# Patient Record
Sex: FEMALE | Race: WHITE | NOT HISPANIC OR LATINO | Employment: UNEMPLOYED | URBAN - METROPOLITAN AREA
[De-identification: names, ages, dates, MRNs, and addresses within clinical notes are randomized per-mention and may not be internally consistent; named-entity substitution may affect disease eponyms.]

---

## 2023-07-25 PROBLEM — O30.049 DICHORIONIC DIAMNIOTIC TWIN GESTATION: Status: ACTIVE | Noted: 2023-07-25

## 2023-08-22 ENCOUNTER — INITIAL PRENATAL (OUTPATIENT)
Dept: OBGYN | Facility: CLINIC | Age: 23
End: 2023-08-22
Payer: COMMERCIAL

## 2023-08-22 ENCOUNTER — GYNECOLOGY VISIT (OUTPATIENT)
Dept: OBGYN | Facility: CLINIC | Age: 23
End: 2023-08-22
Payer: COMMERCIAL

## 2023-08-22 ENCOUNTER — HOSPITAL ENCOUNTER (OUTPATIENT)
Facility: MEDICAL CENTER | Age: 23
End: 2023-08-22
Attending: OBSTETRICS & GYNECOLOGY
Payer: COMMERCIAL

## 2023-08-22 VITALS — WEIGHT: 203 LBS | DIASTOLIC BLOOD PRESSURE: 80 MMHG | SYSTOLIC BLOOD PRESSURE: 133 MMHG

## 2023-08-22 DIAGNOSIS — Z34.90 PREGNANCY, UNSPECIFIED GESTATIONAL AGE: ICD-10-CM

## 2023-08-22 DIAGNOSIS — O26.893 RH NEGATIVE STATE IN ANTEPARTUM PERIOD, THIRD TRIMESTER: ICD-10-CM

## 2023-08-22 DIAGNOSIS — Z67.91 RH NEGATIVE STATE IN ANTEPARTUM PERIOD, THIRD TRIMESTER: ICD-10-CM

## 2023-08-22 DIAGNOSIS — O09.299 HISTORY OF GESTATIONAL DIABETES IN PRIOR PREGNANCY, CURRENTLY PREGNANT: ICD-10-CM

## 2023-08-22 DIAGNOSIS — Z86.32 HISTORY OF GESTATIONAL DIABETES IN PRIOR PREGNANCY, CURRENTLY PREGNANT: ICD-10-CM

## 2023-08-22 DIAGNOSIS — O09.93 SUPERVISION OF HIGH RISK PREGNANCY IN THIRD TRIMESTER: ICD-10-CM

## 2023-08-22 DIAGNOSIS — Z86.32 HISTORY OF GESTATIONAL DIABETES: ICD-10-CM

## 2023-08-22 DIAGNOSIS — O30.043 DICHORIONIC DIAMNIOTIC TWIN PREGNANCY IN THIRD TRIMESTER: ICD-10-CM

## 2023-08-22 LAB
ABO GROUP BLD: NORMAL
BLD GP AB SCN SERPL QL: NORMAL
ERYTHROCYTE [DISTWIDTH] IN BLOOD BY AUTOMATED COUNT: 37 FL (ref 35.9–50)
GLUCOSE 1H P 50 G GLC PO SERPL-MCNC: 197 MG/DL (ref 70–139)
HBV SURFACE AG SER QL: ABNORMAL
HCT VFR BLD AUTO: 37.1 % (ref 37–47)
HCV AB SER QL: ABNORMAL
HGB BLD-MCNC: 12 G/DL (ref 12–16)
HIV 1+2 AB+HIV1 P24 AG SERPL QL IA: NORMAL
MCH RBC QN AUTO: 26.4 PG (ref 27–33)
MCHC RBC AUTO-ENTMCNC: 32.3 G/DL (ref 32.2–35.5)
MCV RBC AUTO: 81.5 FL (ref 81.4–97.8)
PLATELET # BLD AUTO: 314 K/UL (ref 164–446)
PMV BLD AUTO: 10 FL (ref 9–12.9)
RBC # BLD AUTO: 4.55 M/UL (ref 4.2–5.4)
RH BLD: NORMAL
RUBV AB SER QL: 16.4 IU/ML
T PALLIDUM AB SER QL IA: ABNORMAL
WBC # BLD AUTO: 9.8 K/UL (ref 4.8–10.8)

## 2023-08-22 PROCEDURE — 3075F SYST BP GE 130 - 139MM HG: CPT | Performed by: OBSTETRICS & GYNECOLOGY

## 2023-08-22 PROCEDURE — 3079F DIAST BP 80-89 MM HG: CPT | Performed by: OBSTETRICS & GYNECOLOGY

## 2023-08-22 PROCEDURE — 90715 TDAP VACCINE 7 YRS/> IM: CPT | Performed by: OBSTETRICS & GYNECOLOGY

## 2023-08-22 PROCEDURE — 82950 GLUCOSE TEST: CPT

## 2023-08-22 PROCEDURE — 0500F INITIAL PRENATAL CARE VISIT: CPT | Performed by: OBSTETRICS & GYNECOLOGY

## 2023-08-22 PROCEDURE — 96372 THER/PROPH/DIAG INJ SC/IM: CPT | Performed by: OBSTETRICS & GYNECOLOGY

## 2023-08-22 PROCEDURE — 99999 PR NO CHARGE: CPT | Performed by: OBSTETRICS & GYNECOLOGY

## 2023-08-22 PROCEDURE — 87086 URINE CULTURE/COLONY COUNT: CPT

## 2023-08-22 PROCEDURE — 85027 COMPLETE CBC AUTOMATED: CPT

## 2023-08-22 PROCEDURE — 86850 RBC ANTIBODY SCREEN: CPT

## 2023-08-22 PROCEDURE — 87340 HEPATITIS B SURFACE AG IA: CPT

## 2023-08-22 PROCEDURE — 36415 COLL VENOUS BLD VENIPUNCTURE: CPT | Performed by: OBSTETRICS & GYNECOLOGY

## 2023-08-22 PROCEDURE — 86762 RUBELLA ANTIBODY: CPT

## 2023-08-22 PROCEDURE — 90471 IMMUNIZATION ADMIN: CPT | Performed by: OBSTETRICS & GYNECOLOGY

## 2023-08-22 PROCEDURE — 80307 DRUG TEST PRSMV CHEM ANLYZR: CPT

## 2023-08-22 PROCEDURE — 86901 BLOOD TYPING SEROLOGIC RH(D): CPT

## 2023-08-22 PROCEDURE — 86803 HEPATITIS C AB TEST: CPT

## 2023-08-22 PROCEDURE — 86900 BLOOD TYPING SEROLOGIC ABO: CPT

## 2023-08-22 PROCEDURE — 86780 TREPONEMA PALLIDUM: CPT

## 2023-08-22 PROCEDURE — 87389 HIV-1 AG W/HIV-1&-2 AB AG IA: CPT

## 2023-08-22 NOTE — Clinical Note
This encounter was canceled and changed. I signed the encounter type that I did, but this is showing as incomplete. If we could delete that would be great so I can clear my inbox

## 2023-08-22 NOTE — PROGRESS NOTES
Establish Pregnancy Visit    CC: First OB Visit    HPI: Patient is a 23 y.o.  at 30w1d who presents for her first OB visit.  She has known di/di twins and has been following with [facility]. She is feeling fetal movement.  She denies vaginal bleeding, denies leakage of fluid, denies contractions.   She denies nausea/vomiting, headaches, or urinary symptoms.      She has a lab work portal from an outside lab that shows her to be A-, antibody negative and she has not yet received rhogam. [place] 4[place]5 hours away. We discussed that typically we do labwork prior to administering rhogam, but in her case it is appropriate to given it now since she won't be able to return easily. Tdap was also administered    She has a history of two prior pregnancies. G1 was complicated by GDM. G2 was complicated by FGR. Chorionicity of this pregnancy has been confirmed by [facility] and they are starting NSTs at 32 weeks.     DATING:   Estimated Date of Delivery: 10/30/23  LMP (c/w 12wk US)     GYN HX:   Last Pap: , normal   Hx Moderate or Severe Dysplasia : no  Hx STD : no    OBSTETRIC HISTORY:  OB History    Para Term  AB Living   4 2 2   1 2   SAB IAB Ectopic Molar Multiple Live Births   1         2      # Outcome Date GA Lbr Jose Luis/2nd Weight Sex Delivery Anes PTL Lv   4 Current            3 Term 22   5 lb 13 oz M Vag-Spont   MINESH   2 Term 08/10/20   7 lb 2 oz M Vag-Spont   MINESH   1 SAB      SAB          MEDICAL HISTORY:  History reviewed. No pertinent past medical history.    MEDICATIONS:  Current Outpatient Medications on File Prior to Visit   Medication Sig Dispense Refill    Prenatal Vit-Fe Fumarate-FA (PRENATAL 1+1 PO) Take  by mouth.       No current facility-administered medications on file prior to visit.       FAMILY HISTORY:  Family History   Problem Relation Age of Onset    No Known Problems Mother     No Known Problems Father        SURGICAL HISTORY:  Past Surgical History:   Procedure Laterality Date     APPENDECTOMY      OVARIAN CYSTECTOMY         ALLERGIES / REACTIONS:  No Known Allergies             SOCIAL HISTORY:   reports that she has never smoked. She has never used smokeless tobacco. She reports that she does not currently use alcohol. She reports that she does not use drugs.    ROS:   Gen: no fevers or chills, no significant weight loss or gain, excessive fatigue  Respiratory:  no cough or dyspnea  Cardiac:  no chest pain, no palpitations, no syncope  Breast: no breast discharge, pain, lump or skin changes  GI:  no heartburn, no abdominal pain, no nausea or vomiting  Urinary: no dysuria, urgency, frequency, incontinence   Psych: no depression or anxiety  Neuro: no migraines with aura, fainting spells, numbness or tingling  Extremities: no joint pain, persistently swollen ankles, recurrent leg cramps         PHYSICAL EXAMINATION:  Vital Signs:   Vitals:    08/22/23 0937   BP: 133/80   Weight: 203 lb     There is no height or weight on file to calculate BMI.  Constitutional: The patient is well developed and well nourished.  Psychiatric: Patient is oriented to time place and person.   Skin: No rash observed.  Neck: Neck appears symmetric. There are no masses or adenopathy present.  Respiratory: normal effort  Abdomen: Soft, non-tender. Ultrasound was performed to confirm heart tones. Baby A had FHT of 142, Baby B had FHT of 141. Both were moving and active and fluid appeared normal. They were breech/transverse  Pelvic: Deferred   Extremeties: Legs are symmetric and without tenderness. There is no edema present.    ACOG SCREENING  Infection Prevention  1. High Risk For HIV: No 6. Rash Or Illness Since LMP: No     2. High Risk For Hepatitis B or C: No 7. History Of STD, GC, Chlamydia, HPV Syphilis: No     3. Live With Someone With TB Or Exposed To TB: No 8. Have a cat in the home?: No     4. Patient Or Partner Has A History Of Herpes: No      5. History of Chicken Pox: No 9. Other (See Comments Below): No             Genetic Screening/Teratology Counseling- Includes patient, baby's father, or anyone in either family with:  Patient's age 35 years or older as of estimated date of delivery: No     Thalassemia (Italian, Greek, Mediterranean, or  background): MCV less than 80: No     Neural tube defect (Meningomyelocele, Spina bifida, or Anencephaly): No     Congenital heart defect: No     Down syndrome: No     Germán-Sachs (Ashkenazi Sabianist, Cajun, Estonian Dunnell): No     Canavan disease (Ashkenazi Sabianist): No     Familial dysautonomia (Ashkenazi Sabianist): No     Sickle cell disease or trait (): No     Hemophilia or other blood disorders: No     Muscular dystrophy: No    Cystic fibrosis: No     Yuma's chorea: No     Mental retardation/autism: No     Other inherited genetic or chromosomal disorder: No     Maternal metabolic disorder (eg. Type 1 diabetes, PKU): No     Patient or baby's father had child with birth defects not listed above: No     Recurrent pregnancy loss, or a stillbirth: No     Medications (including supplements, vitamins, herbs, or OTC drugs)/illicit/recreational drugs/alcohol since last menstrual period: No             ASSESSMENT AND PLAN:  23 y.o.  at 30w1d     1. Dichorionic diamniotic twin pregnancy in third trimester  - Being followed by [facility] (records are in media)    2. Pregnancy, unspecified gestational age  - PREG CNTR PRENATAL PN; Future  - HEP C VIRUS ANTIBODY; Future  - URINE DRUG SCREEN W/CONF (AR); Future  - GLUCOSE 1HR GESTATIONAL; Future  - Consent for Rhogam  - rho d immune globulin 1500 unit/2 mL  - Chlamydia/GC, PCR (Genital/Anal swab); Future  - Tdap Vaccine =>6YO IM    3. History of gestational diabetes  - One hour pending    4. Rh negative state in antepartum period, third trimester  - Consent for Rhogam  - rho d immune globulin 1500 unit/2 mL    - Dating reviewed: Dated by LMP 12wk US  - Discussed options for genetic/aneuploidy testing and this has already been  completed and was normal   - Discussed recommendation for flu, Covid vaccine during pregnancy  - Discussed office policies, prenatal care timeline, weight gain, diet and activity.  - Taking PNV.  - Increase water intake and encouraged healthy nutrition. Encouraged moderate exercise may continue into final trimester.     Return in 2 weeks for next prenatal visit    Vangie Gutierrez M.D.

## 2023-08-24 ENCOUNTER — TELEPHONE (OUTPATIENT)
Dept: OBGYN | Facility: CLINIC | Age: 23
End: 2023-08-24

## 2023-08-24 LAB
AMPHET CTO UR CFM-MCNC: NEGATIVE NG/ML
BACTERIA UR CULT: NORMAL
BARBITURATES CTO UR CFM-MCNC: NEGATIVE NG/ML
BENZODIAZ CTO UR CFM-MCNC: NEGATIVE NG/ML
CANNABINOIDS CTO UR CFM-MCNC: NEGATIVE NG/ML
COCAINE CTO UR CFM-MCNC: NEGATIVE NG/ML
DRUG COMMENT 753798: NORMAL
METHADONE CTO UR CFM-MCNC: NEGATIVE NG/ML
OPIATES CTO UR CFM-MCNC: NEGATIVE NG/ML
PCP CTO UR CFM-MCNC: NEGATIVE NG/ML
PROPOXYPH CTO UR CFM-MCNC: NEGATIVE NG/ML
SIGNIFICANT IND 70042: NORMAL
SITE SITE: NORMAL
SOURCE SOURCE: NORMAL

## 2023-09-05 ENCOUNTER — NON-PROVIDER VISIT (OUTPATIENT)
Dept: OBGYN | Facility: CLINIC | Age: 23
End: 2023-09-05
Payer: COMMERCIAL

## 2023-09-05 ENCOUNTER — ROUTINE PRENATAL (OUTPATIENT)
Dept: OBGYN | Facility: CLINIC | Age: 23
End: 2023-09-05
Payer: COMMERCIAL

## 2023-09-05 VITALS — WEIGHT: 210.9 LBS

## 2023-09-05 DIAGNOSIS — O30.043 DICHORIONIC DIAMNIOTIC TWIN PREGNANCY IN THIRD TRIMESTER: ICD-10-CM

## 2023-09-05 LAB
NST ACOUSTIC STIMULATION: NORMAL
NST ACTION NECESSARY: NORMAL
NST ASSESSMENT: NORMAL
NST BASELINE: NORMAL
NST INDICATIONS: NORMAL
NST OTHER DATA: NORMAL
NST READ BY: NORMAL
NST RETURN: NORMAL
NST UTERINE ACTIVITY: NORMAL

## 2023-09-05 PROCEDURE — 0502F SUBSEQUENT PRENATAL CARE: CPT | Performed by: PHYSICIAN ASSISTANT

## 2023-09-05 NOTE — PROGRESS NOTES
S: 23 y.o.  at 32w1d presents for routine obstetric follow-up.   Good fetal movement.  No contractions, vaginal bleeding, or leakage of fluid.    Questions answered.    O: LMP 2023   Patients' weight gain, fluid intake and exercise level discussed.  Vitals, fundal height , fetal position, and FHR reviewed on flowsheet    NST non-reactive for both Twin A and B.  Baseline 140 for both with good variablity but no accels or decels. However, pt was seen at [facility] today before appt here and had reactive NST and BBP with  and  for twins. Report given by [facility] but no note yet, will send by end of day.     TDaP: Administered 23  GBS: N/A  Rh: negative Rhogam given 23  BTL:     A/P:  23 y.o.  at 32w1d presents for routine obstetric follow-up.  Size equals dates and/or scan    - Pt failed 1h GTT. Did not do 3h GTT, wants to check sugars x 1-2 weeks instead. Discussed ranges and frequency, has monitor at home. Did fail 1h with 197, likely does not need 3h and has GDM.   - Discussed she does not need NST here and [facility] on same day, can pick one location for NSTs  - Continue prenatal vitamins- pills not gummies.  - Fetal kick counts.  - Exercise at least 30 minutes daily. Drink at least 3-4L of water daily  - PTL precautions educated.    Follow-up in 2 weeks.    Rona Zamarripa P.A.-C.     I reviewed the case with Dr Hall who consulted and agrees with the plan.

## 2023-09-15 ENCOUNTER — ROUTINE PRENATAL (OUTPATIENT)
Dept: OBGYN | Facility: CLINIC | Age: 23
End: 2023-09-15
Payer: COMMERCIAL

## 2023-09-15 VITALS — WEIGHT: 206 LBS | SYSTOLIC BLOOD PRESSURE: 110 MMHG | DIASTOLIC BLOOD PRESSURE: 72 MMHG

## 2023-09-15 DIAGNOSIS — O26.893 RH NEGATIVE STATE IN ANTEPARTUM PERIOD, THIRD TRIMESTER: ICD-10-CM

## 2023-09-15 DIAGNOSIS — O09.93 HIGH-RISK PREGNANCY IN THIRD TRIMESTER: ICD-10-CM

## 2023-09-15 DIAGNOSIS — O30.043 DICHORIONIC DIAMNIOTIC TWIN PREGNANCY IN THIRD TRIMESTER: ICD-10-CM

## 2023-09-15 DIAGNOSIS — Z67.91 RH NEGATIVE STATE IN ANTEPARTUM PERIOD, THIRD TRIMESTER: ICD-10-CM

## 2023-09-15 PROCEDURE — 3078F DIAST BP <80 MM HG: CPT | Performed by: STUDENT IN AN ORGANIZED HEALTH CARE EDUCATION/TRAINING PROGRAM

## 2023-09-15 PROCEDURE — 0502F SUBSEQUENT PRENATAL CARE: CPT | Performed by: STUDENT IN AN ORGANIZED HEALTH CARE EDUCATION/TRAINING PROGRAM

## 2023-09-15 PROCEDURE — 3074F SYST BP LT 130 MM HG: CPT | Performed by: STUDENT IN AN ORGANIZED HEALTH CARE EDUCATION/TRAINING PROGRAM

## 2023-09-15 NOTE — PROGRESS NOTES
OB Visit Note - 33w4d     MEDICAL DECISION MAKING:  Azael Alvarenga is a 23 y.o. female  at 33w4d     S: Pt presents for routine OB follow up. Good fetal movement x2. No contractions, vaginal bleeding, or leakage of fluid. Has monitoring with [facility] later today.  Has been monitoring her blood sugars and has log with her today. Questions answered.     O:  Vitals:    09/15/23 1139   BP: 110/72     Patients' weight gain, fluid intake and exercise level discussed.    FHT: Baby A: 156 bpm; Baby B: 160 bpm    Presentation: breech/breech     See flow sheet.    Glucose lo/6 elevated fasting (115);  1/15 elevated at 1hr postprandial (141)        Recent Labs     23  1130   ABOGROUP A   ABSCRN NEG   HEMOGLOBIN 12.0   PLATELETCT 314   RUBELLAIGG 16.40   HEPBSAG Non-Reactive   HEPCAB Non-Reactive          A/P:Azael Alvarenga is a 23 y.o. female  at 33w4d who presents for routine obstetric follow-up.    P:  Continue FKCs.    Keep all appts with [facility]  Labor and return precautions given.  Instructions given on where to go - patient verbalized understanding.  All questions answered. Anticipatory guidance.  Encouraged adequate water intake.  PP contraception: undecided  GBS at 36 weeks  F/u in 1 week(s) and PRN      Pregnancy complicated by:  Patient Active Problem List   Diagnosis    Dichorionic diamniotic twin gestation    Supervision of high risk pregnancy in third trimester    History of intrauterine growth restriction in prior pregnancy, currently pregnant       The patient will follow up in 1 week(s). She was counseled to call and/or report to L&D for vaginal bleeding, regular contractions, leakage of fluid or decreased fetal movement.    Meliton Garcia D.O.

## 2023-09-21 ENCOUNTER — TELEPHONE (OUTPATIENT)
Dept: OBGYN | Facility: CLINIC | Age: 23
End: 2023-09-21

## 2023-09-22 DIAGNOSIS — O30.003 TWIN GESTATION IN THIRD TRIMESTER, UNSPECIFIED MULTIPLE GESTATION TYPE: ICD-10-CM

## 2023-09-22 NOTE — PROGRESS NOTES
Receive [facility] recommendation to delivery between 36 and 37 weeks which is between 10/2/23-10/9/23. Fetus A is breech so a scheduling request for a  was placed. I called and left a message for the patient.   Vangie Gutierrez M.D.

## 2023-09-26 ENCOUNTER — HOSPITAL ENCOUNTER (OUTPATIENT)
Facility: MEDICAL CENTER | Age: 23
End: 2023-09-26
Attending: OBSTETRICS & GYNECOLOGY | Admitting: OBSTETRICS & GYNECOLOGY
Payer: COMMERCIAL

## 2023-09-29 ENCOUNTER — ROUTINE PRENATAL (OUTPATIENT)
Dept: OBGYN | Facility: CLINIC | Age: 23
End: 2023-09-29
Payer: COMMERCIAL

## 2023-09-29 ENCOUNTER — HOSPITAL ENCOUNTER (OUTPATIENT)
Facility: MEDICAL CENTER | Age: 23
End: 2023-09-29
Attending: OBSTETRICS & GYNECOLOGY
Payer: COMMERCIAL

## 2023-09-29 VITALS — DIASTOLIC BLOOD PRESSURE: 82 MMHG | WEIGHT: 207.4 LBS | SYSTOLIC BLOOD PRESSURE: 111 MMHG

## 2023-09-29 DIAGNOSIS — O30.043 DICHORIONIC DIAMNIOTIC TWIN PREGNANCY IN THIRD TRIMESTER: ICD-10-CM

## 2023-09-29 DIAGNOSIS — O09.93 SUPERVISION OF HIGH RISK PREGNANCY IN THIRD TRIMESTER: Primary | ICD-10-CM

## 2023-09-29 PROCEDURE — 87150 DNA/RNA AMPLIFIED PROBE: CPT

## 2023-09-29 PROCEDURE — 3074F SYST BP LT 130 MM HG: CPT | Performed by: OBSTETRICS & GYNECOLOGY

## 2023-09-29 PROCEDURE — 0502F SUBSEQUENT PRENATAL CARE: CPT | Performed by: OBSTETRICS & GYNECOLOGY

## 2023-09-29 PROCEDURE — 87081 CULTURE SCREEN ONLY: CPT

## 2023-09-29 PROCEDURE — 3079F DIAST BP 80-89 MM HG: CPT | Performed by: OBSTETRICS & GYNECOLOGY

## 2023-09-29 NOTE — PROGRESS NOTES
S: Pt presents for routine OB follow up. Good fetal movement. Denies persistent contractions, vaginal bleeding, or leakage of fluid.     O: /82   Wt 207 lb 6.4 oz   LMP 2023   Patients' weight gain, fluid intake and exercise level discussed.  Vitals, fundal height , fetal position, and FHR reviewed on flowsheet    Lab:No results found for this or any previous visit (from the past 336 hour(s)).    A/P:  23 y.o.  at 35w4d presents for routine obstetric follow-up.    Diagnoses and all orders for this visit:    Supervision of high risk pregnancy in third trimester  -     GRP B STREP, BY PCR (VIVEROS BROTH); Future  -     Induction of Labor and ; Future    Dichorionic diamniotic twin pregnancy in third trimester  -     Induction of Labor and ; Future    IUGR  - Baby A and baby B are IUGR. She has an appointment with [facility] today for dopplers and NST.   - [facility] recommends delivery between 36 and 37 weeks. Patient would like to be delivered at 36 weeks due to 's work schedule. She would really like to have a vaginal delivery. Baby A is currently cephalic. Previously babies were breech. She will need to discuss mode of delivery with physician on call at the hospital that day. Plan for steroids on  and 10/1 with delivery on 10/2.    Labor precautions discussed.  Emma Bowling M.D.

## 2023-09-30 ENCOUNTER — APPOINTMENT (OUTPATIENT)
Dept: OBGYN | Facility: MEDICAL CENTER | Age: 23
End: 2023-09-30
Attending: OBSTETRICS & GYNECOLOGY
Payer: COMMERCIAL

## 2023-09-30 ENCOUNTER — HOSPITAL ENCOUNTER (OUTPATIENT)
Facility: MEDICAL CENTER | Age: 23
End: 2023-09-30
Attending: OBSTETRICS & GYNECOLOGY | Admitting: OBSTETRICS & GYNECOLOGY
Payer: COMMERCIAL

## 2023-09-30 ENCOUNTER — APPOINTMENT (OUTPATIENT)
Dept: RADIOLOGY | Facility: MEDICAL CENTER | Age: 23
End: 2023-09-30
Payer: COMMERCIAL

## 2023-09-30 VITALS
HEIGHT: 66 IN | BODY MASS INDEX: 33.27 KG/M2 | DIASTOLIC BLOOD PRESSURE: 71 MMHG | SYSTOLIC BLOOD PRESSURE: 110 MMHG | WEIGHT: 207 LBS | HEART RATE: 101 BPM | TEMPERATURE: 98.8 F | RESPIRATION RATE: 16 BRPM | OXYGEN SATURATION: 96 %

## 2023-09-30 LAB — GP B STREP DNA SPEC QL NAA+PROBE: NEGATIVE

## 2023-09-30 PROCEDURE — 700111 HCHG RX REV CODE 636 W/ 250 OVERRIDE (IP): Performed by: OBSTETRICS & GYNECOLOGY

## 2023-09-30 PROCEDURE — 96372 THER/PROPH/DIAG INJ SC/IM: CPT

## 2023-09-30 PROCEDURE — 59025 FETAL NON-STRESS TEST: CPT

## 2023-09-30 PROCEDURE — 76819 FETAL BIOPHYS PROFIL W/O NST: CPT

## 2023-09-30 RX ORDER — BETAMETHASONE SODIUM PHOSPHATE AND BETAMETHASONE ACETATE 3; 3 MG/ML; MG/ML
12 INJECTION, SUSPENSION INTRA-ARTICULAR; INTRALESIONAL; INTRAMUSCULAR; SOFT TISSUE EVERY 24 HOURS
Status: DISCONTINUED | OUTPATIENT
Start: 2023-09-30 | End: 2023-09-30 | Stop reason: HOSPADM

## 2023-09-30 RX ADMIN — BETAMETHASONE SODIUM PHOSPHATE AND BETAMETHASONE ACETATE 12 MG: 3; 3 INJECTION, SUSPENSION INTRA-ARTICULAR; INTRALESIONAL; INTRAMUSCULAR at 09:49

## 2023-09-30 NOTE — PROGRESS NOTES
, EDC 10/30, GA 35w5d, di-di twins. Pt presents to L&D for scheduled betamethasone injection. Pt denies LOF, VB, or UCs and reports + fetal movement. Pt escorted to triage room, oriented to room and unit, and external monitors applied. POC discussed with pt and s/o and encouraged to state needs or questions at any time.     0908 Dr. Ashton given report. Provider to bedside, POC discussed with pt and s/o.    0917 Dr. Watts at bedside, POC discussed with pt and s/o.    1153 Ultrasonographer at bedside, monitors removed for exam.    1235 Dr. Ashton updated, D/C order received.    1241 L&D D/C instructions reviewed with pt and s/o who state understanding. Pt d/c to self with s/o.

## 2023-09-30 NOTE — ED PROVIDER NOTES
Attestation statement:      I have personally seen and examined the patient and discussed the management with the resident.  I reviewed the residents note and agree with the documented findings and plan of care.  Additional attending comments;     LABOR AND DELIVERY TRIAGE NOTE    PATIENT ID:  NAME:  Azael Alvarenga  MRN:               [id number]  YOB: 2000    This is a pleasant 23 y.o. female  at 35w5d here for scheduled betamethasone dose.  Pregnancy has been complicated by di-/di twin gestation with severe IUGR affecting both babies.  Has a history of IUGR  and GDM in previous pregnancies. She has been followed by [facility] with routine NSTs starting at 32 weeks. Patient has scheduled IOL in 2 days when she is 36 weeks.  Will be receiving second dose of betamethasone tomorrow, 10/1/23.     negative for contractions  negative pain   negative for LOF  negative for vaginal bleeding  positive for fetal movement    PNL:  Blood Type A, Rh negative, received RhoGAM given 2023, Rubella immune, HIV neg, TrepAb neg, HBsAg NR, GC/CT unk/unk  1 HR GTT: 197  3 HR GTT: Declined  GBS pending     ROS: Patient denies any fever chills, nausea, vomiting, headache, chest pain, shortness of breath, or dysuria or unusual swelling of hands or feet.     PMHx:   No past medical history on file.     OB History    Para Term  AB Living   4 2 2   1 2   SAB IAB Ectopic Molar Multiple Live Births   1         2      # Outcome Date GA Lbr Jose Luis/2nd Weight Sex Delivery Anes PTL Lv   4 Current            3 Term 22   2.637 kg (5 lb 13 oz) M Vag-Spont   MINESH   2 Term 08/10/20   3.232 kg (7 lb 2 oz) M Vag-Spont   MINESH   1 SAB      SAB          GYN: denies STIs, no cervical procedures    PSHx:  Past Surgical History:   Procedure Laterality Date    APPENDECTOMY      OVARIAN CYSTECTOMY         Social Hx:  Social History     Tobacco Use    Smoking status: Never    Smokeless tobacco: Never   Vaping Use    Vaping  "Use: Never used   Substance Use Topics    Alcohol use: Not Currently    Drug use: Never         Medications:  No current facility-administered medications on file prior to encounter.     Current Outpatient Medications on File Prior to Encounter   Medication Sig Dispense Refill    Prenatal Vit-Fe Fumarate-FA (PRENATAL 1+1 PO) Take  by mouth. (Patient not taking: Reported on 2023)         Allergies:  Patient has no known allergies.        Objective:    Vitals:    23 0903   BP: 110/71   Pulse: (!) 101   Resp: 16   Temp: 37.1 °C (98.8 °F)   TempSrc: Temporal   SpO2: 96%   Weight: 93.9 kg (207 lb)   Height: 1.676 m (5' 6\")     Temp (24hrs), Av.1 °C (98.8 °F), Min:37.1 °C (98.8 °F), Max:37.1 °C (98.8 °F)    General: No acute distress, resting comfortably in bed.  HEENT: normocephalic, nontraumatic, PERRLA, EOMI  Cardiovascular: Heart RRR with no murmurs, rubs or gallops. Distal Pulses 2+  Respiratory: symmetric chest expansion, lungs CTAB, with no wheezes, rales, rhonci  Abdomen: gravid, nontender  Musculoskeletal: SIEGEL spontaneously  Neuro: non focal with no numbness, tingling or changes in sensation    Cervix:  Deferred  [place]: Uterine Contractions: none    FHT:  Twin A baseline 140, moderate variability, +accels, no decels, passed NST   Twin B baseline 130, moderate variability, minimal accels, no decels, failed NST     BPP:  8/8 both twins     Labs:   GBS pending     Assessment: 23 y.o. female  at 35w5d presents for scheduled BTMZ for  IOL scheduled on 10/2/2023.     #SIUP @ 35w5d by 12wk   #Di/Di twin pregnancy  #Severe IUGR   #History of IUGR in previous pregnancy  -Cont. Prenatal care with [facility]  -Followed by [facility]  -Rec'd BTMZ x1 today   -Second scheduled BTMZ dose tomorrow, 10/1/2023  -Plan for scheduled IOL on 10/2/2023    #GDM  -History of GDMA1 in G1 pregnancy  -Failed 1hr gtt (197), refused 3hr gtt  -Has been keeping BS log, does not want to be on medications   -Encouraged cont. BS " log, exercise/diet/hydration     #Fetal status   -Twin A passed NST, twin B failed NST, therefore BPP was obtained and 8/8 for both twins    #GBS status  -GBS: unknown, results pending      #Rubella immune, HIV neg, TrepAb neg, HBsAg NR, GC/CT u/u    #healthcare maintenance   -TdaP 8/22/2023      Patient is cleared to return home with family. Encouraged to see MD/DO for increased painful uterine contractions @ 3-5, vaginal bleeding, loss of fluid, or other serious symptoms. Return tomorrow for next BTMZ dose, and scheduled IOL on 10/2.       Discussed case with Dr. Jose G Watts, [facility] Attending. Case was discussed and attending agreed with plan prior to discharge of patient.      Azeb Ashton D.O.  PGY-1, [facility]

## 2023-10-01 ENCOUNTER — HOSPITAL ENCOUNTER (OUTPATIENT)
Facility: MEDICAL CENTER | Age: 23
End: 2023-10-01
Attending: OBSTETRICS & GYNECOLOGY | Admitting: OBSTETRICS & GYNECOLOGY
Payer: COMMERCIAL

## 2023-10-01 ENCOUNTER — APPOINTMENT (OUTPATIENT)
Dept: OBGYN | Facility: MEDICAL CENTER | Age: 23
End: 2023-10-01
Attending: OBSTETRICS & GYNECOLOGY
Payer: COMMERCIAL

## 2023-10-01 VITALS
DIASTOLIC BLOOD PRESSURE: 73 MMHG | OXYGEN SATURATION: 95 % | RESPIRATION RATE: 18 BRPM | TEMPERATURE: 97.3 F | WEIGHT: 207 LBS | HEIGHT: 66 IN | BODY MASS INDEX: 33.27 KG/M2 | SYSTOLIC BLOOD PRESSURE: 108 MMHG | HEART RATE: 109 BPM

## 2023-10-01 PROCEDURE — 96372 THER/PROPH/DIAG INJ SC/IM: CPT

## 2023-10-01 PROCEDURE — 99999 PR NO CHARGE: CPT | Performed by: OBSTETRICS & GYNECOLOGY

## 2023-10-01 PROCEDURE — 59025 FETAL NON-STRESS TEST: CPT

## 2023-10-01 PROCEDURE — 700111 HCHG RX REV CODE 636 W/ 250 OVERRIDE (IP): Performed by: OBSTETRICS & GYNECOLOGY

## 2023-10-01 PROCEDURE — 59025 FETAL NON-STRESS TEST: CPT | Mod: 26 | Performed by: OBSTETRICS & GYNECOLOGY

## 2023-10-01 RX ORDER — BETAMETHASONE SODIUM PHOSPHATE AND BETAMETHASONE ACETATE 3; 3 MG/ML; MG/ML
12 INJECTION, SUSPENSION INTRA-ARTICULAR; INTRALESIONAL; INTRAMUSCULAR; SOFT TISSUE ONCE
Status: COMPLETED | OUTPATIENT
Start: 2023-10-01 | End: 2023-10-01

## 2023-10-01 RX ADMIN — BETAMETHASONE SODIUM PHOSPHATE AND BETAMETHASONE ACETATE 12 MG: 3; 3 INJECTION, SUSPENSION INTRA-ARTICULAR; INTRALESIONAL; INTRAMUSCULAR at 10:10

## 2023-10-01 NOTE — PROGRESS NOTES
0900- Pt is a  at 35.6 weeks gestation with di-di twins, presenting to triage for scheduled betamethasone injection. Pt reports +FM x2, no utx, no LOF or VB, no HA/vision changes/RUQ pain or n/v, EFM/ TOCO applied x3, call light within reach.    1000- Dr. Ashton in department, update given, provider to bedside.  1020-  in department, strips reviewed, orders received for discharge.   1030- Discharge instructions educated to patient with  labor precautions and pt verbalized understanding of when to return to the hospital. Pt states that she feels safe leaving at this time. D/C paperwork signed.

## 2023-10-01 NOTE — ED PROVIDER NOTES
LABOR & DELIVERY TRIAGE HISTORY AND PHYSICAL  Patient Name: Azael Alvarenga Age/Sex: 23 y.o. female  : 2000 MRN: [id number]      HISTORY OF PRESENT ILLNESS:  This is a pleasant 23 y.o. female  at 35w6d here for second scheduled betamethasone dose. Has scheduled IOL for tomorrow secondary to pregnancy c/b di-/di twin gestation with severe IUGR affecting both babies.  Has a history of IUGR and GDM in her two previous pregnancies. She has been followed by [facility] with routine NSTs starting at 32 weeks.  She was in the clinic yesterday for her initial dose of betamethasone and at that time twin A had a reactive NST, and twin B had a nonreactive NST.  BPP was completed and  for both twins.    Fetal movement: Yes  Leakage of Fluid: No  Vaginal Bleeding: No  Contractions: No    Her EDC is 10/30/2023, by Last Menstrual Period.     She has received prenatal care with [facility], followed by [facility].  Complications during pregnancy: Di/Di twin gestation with severe IUGR in both twins     History of STD: No     OBSTETRICAL HISTORY:    OB History    Para Term  AB Living   4 2 2   1 2   SAB IAB Ectopic Molar Multiple Live Births   1         2      # Outcome Date GA Lbr Jose Luis/2nd Weight Sex Delivery Anes PTL Lv   4 Current            3 Term 22   2.637 kg (5 lb 13 oz) M Vag-Spont   MINESH   2 Term 08/10/20   3.232 kg (7 lb 2 oz) M Vag-Spont   MINESH   1 SAB      SAB          MEDICAL HISTORY:  No past medical history on file.    SURGICAL HISTORY:  Past Surgical History:   Procedure Laterality Date    APPENDECTOMY      OVARIAN CYSTECTOMY         MEDICATIONS:  Medications Prior to Admission   Medication Sig Dispense Refill Last Dose    Prenatal Vit-Fe Fumarate-FA (PRENATAL 1+1 PO) Take  by mouth. (Patient not taking: Reported on 2023)          ALLERGIES:  No Known Allergies     SOCIAL HISTORY:   Tobacco use: No  Alcohol use: No  Recreational drug use: No   reports that she has never smoked. She has never used  smokeless tobacco. She reports that she does not currently use alcohol. She reports that she does not use drugs.    FAMILY HISTORY:  Clotting/bleeding disorders: No  Gynecological cancers: No  Family History   Problem Relation Age of Onset    No Known Problems Mother     No Known Problems Father        REVIEW OF SYSTEMS:  A comprehensive review of systems was negative.      PHYSICAL EXAM:  Temp:  [36.3 °C (97.3 °F)] 36.3 °C (97.3 °F)  Pulse:  [109-110] 109  Resp:  [18] 18  BP: (108)/(73) 108/73  SpO2:  [95 %] 95 %  Body mass index is 33.41 kg/m².    General:  AAOx3, NAD   Pulmonary:  Non-labored respirations   Abdomen: Soft and non-tender, gravid uterus   FHT: Both twins: baseline 140-145, moderate variability, +accels, no decels   Contractions: None   SVE: Deferred        Prenatal Labs:  Blood Type A, Rh negative, received RhoGAM given 2023, Rubella immune, HIV neg, TrepAb neg, HBsAg NR, GC/CT unk/unk  1 HR GTT: 197  3 HR GTT: Declined  GBS negative       ASSESSMENT/ PLAN:  23 y.o. female  at 35w6d here for second scheduled betamethasone dose.     1. BTMZ administered  2. NSTs reactive in both infants  3. Discharge home and return tomorrow for scheduled IOL    Pt encouraged to return sooner for increased painful uterine contractions @ 3-5, vaginal bleeding, loss of fluid, or other serious symptoms. Discussed case with Dr. Katt Wang, [facility] Attending. Case was discussed and attending agreed with plan prior to discharge of patient.      Azeb Ashton DO   PGY-1 Resident   [facility]

## 2023-10-02 ENCOUNTER — ANESTHESIA (OUTPATIENT)
Dept: ANESTHESIOLOGY | Facility: MEDICAL CENTER | Age: 23
End: 2023-10-02
Payer: COMMERCIAL

## 2023-10-02 ENCOUNTER — ANESTHESIA EVENT (OUTPATIENT)
Dept: ANESTHESIOLOGY | Facility: MEDICAL CENTER | Age: 23
End: 2023-10-02
Payer: COMMERCIAL

## 2023-10-02 ENCOUNTER — APPOINTMENT (OUTPATIENT)
Dept: OBGYN | Facility: MEDICAL CENTER | Age: 23
End: 2023-10-02
Attending: OBSTETRICS & GYNECOLOGY
Payer: COMMERCIAL

## 2023-10-02 ENCOUNTER — HOSPITAL ENCOUNTER (INPATIENT)
Facility: MEDICAL CENTER | Age: 23
LOS: 2 days | End: 2023-10-04
Attending: OBSTETRICS & GYNECOLOGY | Admitting: OBSTETRICS & GYNECOLOGY
Payer: COMMERCIAL

## 2023-10-02 LAB
BASOPHILS # BLD AUTO: 0.1 % (ref 0–1.8)
BASOPHILS # BLD: 0.01 K/UL (ref 0–0.12)
EOSINOPHIL # BLD AUTO: 0 K/UL (ref 0–0.51)
EOSINOPHIL NFR BLD: 0 % (ref 0–6.9)
ERYTHROCYTE [DISTWIDTH] IN BLOOD BY AUTOMATED COUNT: 36.5 FL (ref 35.9–50)
HCT VFR BLD AUTO: 31.6 % (ref 37–47)
HGB BLD-MCNC: 10.1 G/DL (ref 12–16)
HOLDING TUBE BB 8507: NORMAL
IMM GRANULOCYTES # BLD AUTO: 0.07 K/UL (ref 0–0.11)
IMM GRANULOCYTES NFR BLD AUTO: 0.5 % (ref 0–0.9)
LYMPHOCYTES # BLD AUTO: 2.33 K/UL (ref 1–4.8)
LYMPHOCYTES NFR BLD: 17.4 % (ref 22–41)
MCH RBC QN AUTO: 24.7 PG (ref 27–33)
MCHC RBC AUTO-ENTMCNC: 32 G/DL (ref 32.2–35.5)
MCV RBC AUTO: 77.3 FL (ref 81.4–97.8)
MONOCYTES # BLD AUTO: 0.93 K/UL (ref 0–0.85)
MONOCYTES NFR BLD AUTO: 6.9 % (ref 0–13.4)
NEUTROPHILS # BLD AUTO: 10.06 K/UL (ref 1.82–7.42)
NEUTROPHILS NFR BLD: 75.1 % (ref 44–72)
NRBC # BLD AUTO: 0 K/UL
NRBC BLD-RTO: 0 /100 WBC (ref 0–0.2)
PLATELET # BLD AUTO: 296 K/UL (ref 164–446)
PMV BLD AUTO: 10.2 FL (ref 9–12.9)
RBC # BLD AUTO: 4.09 M/UL (ref 4.2–5.4)
T PALLIDUM AB SER QL IA: NORMAL
WBC # BLD AUTO: 13.4 K/UL (ref 4.8–10.8)

## 2023-10-02 PROCEDURE — 700111 HCHG RX REV CODE 636 W/ 250 OVERRIDE (IP): Performed by: STUDENT IN AN ORGANIZED HEALTH CARE EDUCATION/TRAINING PROGRAM

## 2023-10-02 PROCEDURE — 303615 HCHG EPIDURAL/SPINAL ANESTHESIA FOR LABOR

## 2023-10-02 PROCEDURE — 3E0P7VZ INTRODUCTION OF HORMONE INTO FEMALE REPRODUCTIVE, VIA NATURAL OR ARTIFICIAL OPENING: ICD-10-PCS | Performed by: OBSTETRICS & GYNECOLOGY

## 2023-10-02 PROCEDURE — 700105 HCHG RX REV CODE 258: Performed by: STUDENT IN AN ORGANIZED HEALTH CARE EDUCATION/TRAINING PROGRAM

## 2023-10-02 PROCEDURE — 36415 COLL VENOUS BLD VENIPUNCTURE: CPT

## 2023-10-02 PROCEDURE — 10907ZC DRAINAGE OF AMNIOTIC FLUID, THERAPEUTIC FROM PRODUCTS OF CONCEPTION, VIA NATURAL OR ARTIFICIAL OPENING: ICD-10-PCS | Performed by: OBSTETRICS & GYNECOLOGY

## 2023-10-02 PROCEDURE — 700101 HCHG RX REV CODE 250: Performed by: STUDENT IN AN ORGANIZED HEALTH CARE EDUCATION/TRAINING PROGRAM

## 2023-10-02 PROCEDURE — 700111 HCHG RX REV CODE 636 W/ 250 OVERRIDE (IP): Mod: JZ | Performed by: STUDENT IN AN ORGANIZED HEALTH CARE EDUCATION/TRAINING PROGRAM

## 2023-10-02 PROCEDURE — 88307 TISSUE EXAM BY PATHOLOGIST: CPT | Mod: 59

## 2023-10-02 PROCEDURE — 86780 TREPONEMA PALLIDUM: CPT

## 2023-10-02 PROCEDURE — 85025 COMPLETE CBC W/AUTO DIFF WBC: CPT

## 2023-10-02 PROCEDURE — 700102 HCHG RX REV CODE 250 W/ 637 OVERRIDE(OP): Performed by: STUDENT IN AN ORGANIZED HEALTH CARE EDUCATION/TRAINING PROGRAM

## 2023-10-02 PROCEDURE — 770002 HCHG ROOM/CARE - OB PRIVATE (112)

## 2023-10-02 PROCEDURE — 700111 HCHG RX REV CODE 636 W/ 250 OVERRIDE (IP): Mod: JZ

## 2023-10-02 PROCEDURE — A9270 NON-COVERED ITEM OR SERVICE: HCPCS | Performed by: STUDENT IN AN ORGANIZED HEALTH CARE EDUCATION/TRAINING PROGRAM

## 2023-10-02 RX ORDER — CARBOPROST TROMETHAMINE 250 UG/ML
250 INJECTION, SOLUTION INTRAMUSCULAR
Status: DISCONTINUED | OUTPATIENT
Start: 2023-10-02 | End: 2023-10-03 | Stop reason: HOSPADM

## 2023-10-02 RX ORDER — ALUMINA, MAGNESIA, AND SIMETHICONE 2400; 2400; 240 MG/30ML; MG/30ML; MG/30ML
30 SUSPENSION ORAL EVERY 6 HOURS PRN
Status: DISCONTINUED | OUTPATIENT
Start: 2023-10-02 | End: 2023-10-03 | Stop reason: HOSPADM

## 2023-10-02 RX ORDER — ROPIVACAINE HYDROCHLORIDE 2 MG/ML
INJECTION, SOLUTION EPIDURAL; INFILTRATION PRN
Status: DISCONTINUED | OUTPATIENT
Start: 2023-10-02 | End: 2023-10-02 | Stop reason: SURG

## 2023-10-02 RX ORDER — ONDANSETRON 2 MG/ML
4 INJECTION INTRAMUSCULAR; INTRAVENOUS EVERY 6 HOURS PRN
Status: DISCONTINUED | OUTPATIENT
Start: 2023-10-02 | End: 2023-10-03 | Stop reason: HOSPADM

## 2023-10-02 RX ORDER — EPHEDRINE SULFATE 50 MG/ML
5 INJECTION, SOLUTION INTRAVENOUS
Status: DISCONTINUED | OUTPATIENT
Start: 2023-10-02 | End: 2023-10-03 | Stop reason: HOSPADM

## 2023-10-02 RX ORDER — SODIUM CHLORIDE, SODIUM LACTATE, POTASSIUM CHLORIDE, CALCIUM CHLORIDE 600; 310; 30; 20 MG/100ML; MG/100ML; MG/100ML; MG/100ML
INJECTION, SOLUTION INTRAVENOUS CONTINUOUS
Status: DISCONTINUED | OUTPATIENT
Start: 2023-10-02 | End: 2023-10-03

## 2023-10-02 RX ORDER — ACETAMINOPHEN 500 MG
1000 TABLET ORAL
Status: DISCONTINUED | OUTPATIENT
Start: 2023-10-02 | End: 2023-10-03 | Stop reason: HOSPADM

## 2023-10-02 RX ORDER — TERBUTALINE SULFATE 1 MG/ML
0.25 INJECTION, SOLUTION SUBCUTANEOUS
Status: DISCONTINUED | OUTPATIENT
Start: 2023-10-02 | End: 2023-10-03 | Stop reason: HOSPADM

## 2023-10-02 RX ORDER — LIDOCAINE HYDROCHLORIDE 10 MG/ML
20 INJECTION, SOLUTION INFILTRATION; PERINEURAL
Status: DISCONTINUED | OUTPATIENT
Start: 2023-10-02 | End: 2023-10-03 | Stop reason: HOSPADM

## 2023-10-02 RX ORDER — SODIUM CHLORIDE, SODIUM LACTATE, POTASSIUM CHLORIDE, AND CALCIUM CHLORIDE .6; .31; .03; .02 G/100ML; G/100ML; G/100ML; G/100ML
1000 INJECTION, SOLUTION INTRAVENOUS
Status: COMPLETED | OUTPATIENT
Start: 2023-10-02 | End: 2023-10-02

## 2023-10-02 RX ORDER — LIDOCAINE HYDROCHLORIDE AND EPINEPHRINE 15; 5 MG/ML; UG/ML
INJECTION, SOLUTION EPIDURAL PRN
Status: DISCONTINUED | OUTPATIENT
Start: 2023-10-02 | End: 2023-10-02 | Stop reason: SURG

## 2023-10-02 RX ORDER — MISOPROSTOL 200 UG/1
800 TABLET ORAL
Status: DISCONTINUED | OUTPATIENT
Start: 2023-10-02 | End: 2023-10-03 | Stop reason: HOSPADM

## 2023-10-02 RX ORDER — IBUPROFEN 800 MG/1
800 TABLET ORAL
Status: COMPLETED | OUTPATIENT
Start: 2023-10-02 | End: 2023-10-03

## 2023-10-02 RX ORDER — ROPIVACAINE HYDROCHLORIDE 2 MG/ML
INJECTION, SOLUTION EPIDURAL; INFILTRATION; PERINEURAL CONTINUOUS
Status: DISCONTINUED | OUTPATIENT
Start: 2023-10-02 | End: 2023-10-03

## 2023-10-02 RX ORDER — ONDANSETRON 4 MG/1
4 TABLET, ORALLY DISINTEGRATING ORAL EVERY 6 HOURS PRN
Status: DISCONTINUED | OUTPATIENT
Start: 2023-10-02 | End: 2023-10-03 | Stop reason: HOSPADM

## 2023-10-02 RX ORDER — ROPIVACAINE HYDROCHLORIDE 2 MG/ML
INJECTION, SOLUTION EPIDURAL; INFILTRATION; PERINEURAL
Status: COMPLETED
Start: 2023-10-02 | End: 2023-10-02

## 2023-10-02 RX ORDER — METHYLERGONOVINE MALEATE 0.2 MG/ML
0.2 INJECTION INTRAVENOUS
Status: DISCONTINUED | OUTPATIENT
Start: 2023-10-02 | End: 2023-10-03 | Stop reason: HOSPADM

## 2023-10-02 RX ORDER — HYDROXYZINE 50 MG/1
50 TABLET, FILM COATED ORAL EVERY 6 HOURS PRN
Status: DISCONTINUED | OUTPATIENT
Start: 2023-10-02 | End: 2023-10-03 | Stop reason: HOSPADM

## 2023-10-02 RX ORDER — SODIUM CHLORIDE, SODIUM LACTATE, POTASSIUM CHLORIDE, AND CALCIUM CHLORIDE .6; .31; .03; .02 G/100ML; G/100ML; G/100ML; G/100ML
250 INJECTION, SOLUTION INTRAVENOUS PRN
Status: DISCONTINUED | OUTPATIENT
Start: 2023-10-02 | End: 2023-10-03 | Stop reason: HOSPADM

## 2023-10-02 RX ORDER — OXYTOCIN 10 [USP'U]/ML
10 INJECTION, SOLUTION INTRAMUSCULAR; INTRAVENOUS
Status: DISCONTINUED | OUTPATIENT
Start: 2023-10-02 | End: 2023-10-03 | Stop reason: HOSPADM

## 2023-10-02 RX ADMIN — ROPIVACAINE HYDROCHLORIDE 10 ML: 5 INJECTION, SOLUTION EPIDURAL; INFILTRATION; PERINEURAL at 17:30

## 2023-10-02 RX ADMIN — SODIUM CHLORIDE, POTASSIUM CHLORIDE, SODIUM LACTATE AND CALCIUM CHLORIDE: 600; 310; 30; 20 INJECTION, SOLUTION INTRAVENOUS at 17:48

## 2023-10-02 RX ADMIN — OXYTOCIN 2 MILLI-UNITS/MIN: 10 INJECTION, SOLUTION INTRAMUSCULAR; INTRAVENOUS at 18:28

## 2023-10-02 RX ADMIN — SODIUM CHLORIDE, POTASSIUM CHLORIDE, SODIUM LACTATE AND CALCIUM CHLORIDE 1000 ML: 600; 310; 30; 20 INJECTION, SOLUTION INTRAVENOUS at 17:05

## 2023-10-02 RX ADMIN — ROPIVACAINE HYDROCHLORIDE 200 MG: 2 INJECTION, SOLUTION EPIDURAL; INFILTRATION; PERINEURAL at 17:34

## 2023-10-02 RX ADMIN — LIDOCAINE HYDROCHLORIDE,EPINEPHRINE BITARTRATE 3 ML: 15; .005 INJECTION, SOLUTION EPIDURAL; INFILTRATION; INTRACAUDAL; PERINEURAL at 17:27

## 2023-10-02 RX ADMIN — MISOPROSTOL 25 MCG: 100 TABLET ORAL at 13:01

## 2023-10-02 RX ADMIN — OXYTOCIN 20 UNITS: 10 INJECTION, SOLUTION INTRAMUSCULAR; INTRAVENOUS at 23:44

## 2023-10-02 RX ADMIN — ROPIVACAINE HYDROCHLORIDE 200 MG: 2 INJECTION, SOLUTION EPIDURAL; INFILTRATION at 17:34

## 2023-10-02 ASSESSMENT — LIFESTYLE VARIABLES
ALCOHOL_USE: NO
EVER_SMOKED: NEVER

## 2023-10-02 ASSESSMENT — PATIENT HEALTH QUESTIONNAIRE - PHQ9
2. FEELING DOWN, DEPRESSED, IRRITABLE, OR HOPELESS: NOT AT ALL
SUM OF ALL RESPONSES TO PHQ9 QUESTIONS 1 AND 2: 0
1. LITTLE INTEREST OR PLEASURE IN DOING THINGS: NOT AT ALL

## 2023-10-02 NOTE — H&P
OB H&P:    CC: IOL, di-di twins with IUGR    HPI:  Azael Alvarenga is a 23 y.o.  pregnant with dichorionic diamniotic twin gestation @ 36w0d by LMP c/w 12 week U/S presenting for IOL for IUGR of both twins. Per most recent [facility] note from visit on 23, twin A with AC 8th percentile and twin B with AC 1st percentile and UA doppler with normal S/D ratios for both twins. She had steroid course with second dose on 10/1 in the morning.     Contractions: No   Loss of fluid: No   Vaginal bleeding: No   Fetal movement: present      PNC with Dr. Metzger and transferred to [facility] at 30 weeks    PNL:  Rh-, RI, HIV NR, treponemal antibody NR, HBsAg NR, HCV NR, GC/CT neg/neg  GBS neg      ROS:  Const: denies fevers, general concerns  ENT: Denies rhinorrhea, congestion, sore throat  CV: Denies CP or significant peripheral edema  Resp: Denies dyspnea, cough  GI: denies abd pain, GI concerns  : see HPI  Neuro: denies new HA or vision changes    OB History    Para Term  AB Living   4 2 2   1 2   SAB IAB Ectopic Molar Multiple Live Births   1         2      # Outcome Date GA Lbr Jose Luis/2nd Weight Sex Delivery Anes PTL Lv   4 Current            3 Term 22   2.637 kg (5 lb 13 oz) M Vag-Spont   MINESH   2 Term 08/10/20   3.232 kg (7 lb 2 oz) M Vag-Spont   MINESH   1 SAB      SAB          GYN: denies STIs, no cervical procedures    History reviewed. No pertinent past medical history.    Past Surgical History:   Procedure Laterality Date    APPENDECTOMY      OVARIAN CYSTECTOMY         No current facility-administered medications on file prior to encounter.     Current Outpatient Medications on File Prior to Encounter   Medication Sig Dispense Refill    Prenatal Vit-Fe Fumarate-FA (PRENATAL 1+1 PO) Take  by mouth. (Patient not taking: Reported on 2023)         Family History   Problem Relation Age of Onset    No Known Problems Mother     No Known Problems Father        Social History     Socioeconomic History     "Marital status:      Spouse name: Not on file    Number of children: Not on file    Years of education: Not on file    Highest education level: Not on file   Occupational History    Not on file   Tobacco Use    Smoking status: Never    Smokeless tobacco: Never   Vaping Use    Vaping Use: Never used   Substance and Sexual Activity    Alcohol use: Not Currently    Drug use: Never    Sexual activity: Yes     Partners: Male   Other Topics Concern    Not on file   Social History Narrative    Not on file     Social Determinants of Health     Financial Resource Strain: Not on file   Food Insecurity: Not on file   Transportation Needs: Not on file   Physical Activity: Not on file   Stress: Not on file   Social Connections: Not on file   Intimate Partner Violence: Not on file   Housing Stability: Not on file       PE:  Vitals:    10/02/23 1115   BP: 107/68   Pulse: (!) 101   Temp: 36.5 °C (97.7 °F)   TempSrc: Temporal   Weight: 93.9 kg (207 lb)   Height: 1.676 m (5' 6\")     Gen: alert, conversant, no acute distress  CV: RRR, nl S1 and S2 without murmurs, cap refill <2 sec  Resp: Unlabored respirations, symmetric chest rise, CTAB  abd: soft, gravid, NT  Ext: NT, no edema  Neuro: No gross focal deficits, sensation intact to light touch throughout    Twin presentation cephalic-cephalic by BSUS    SVE: 0/50/-3, soft and mid-position @ approximately 12:30  FHT:   Twin A: 130/moderate variability/+ accels/no decels  Twin B: 130-135/moderate variability/+accels/no decels  williams: no ctx or irritability    A/P: 23 y.o.  with di-di twin gestation @ 36w0d by 12 week U/S presenting for scheduled IOL for IUGR of both twins.    #Di-di twin gestation  #IUGR of Twin A and Twin B  Cephalic-cephalic presentation by BSUS  Last [facility] note from  shows twin A with AC 8th percentile and twin with AC 1st percentile. UA with normal S/D ratios for Twin A and Twin B. Recommend delivery between 36 and 37 weeks gestation.  Cervical " unfavorable but soft consistency and with prior vaginal delivery x2  GBS negative  - Admit to L&D  - continuous electronic fetal monitoring  - misoprostol 25 mcg vaginally for ripening; recheck 4 hours after placed or sooner as indicated  - Counseled patient about plans to deliver in OR given twin gestation, she verbalizes understanding  - Planning for epidural for pain control    #Increased risk for PPH due to multiple gestation  - accepting of blood transfusion if needed   - Had asthma as child but no current Rx    #Rh negative status  - Will need Rhogam postpartum    #Impaired glucose tolerance  Elevated 1-hour GTT, declined 3 hour GTT but had normal fasting and 1-hour PP blood glucoses with home monitoring.      Wendi Prieto M.D.

## 2023-10-02 NOTE — CARE PLAN
Goals: Vaginal delivery of twin boys.    Problem: Risk for Infection and Impaired Wound Healing  Goal: Patient will remain free from infection  Outcome: Progressing  Note: Pt monitored for s/s of infection. None at this time.      Problem: Pain  Goal: Patient's pain will be alleviated or reduced to the patient’s comfort goal  Outcome: Progressing  Note: Pain controlled at this time. Plan is for Epidural when in labor for pain controlled.      The patient is Stable - Low risk of patient condition declining or worsening    Progress made toward(s) clinical / shift goals: No s/s of infection, pain controlled. Inducing for delivery.    Patient is not progressing towards the following goals: N/A

## 2023-10-02 NOTE — ED PROVIDER NOTES
This patient spent 0 minutes in triage and was admitted directly to L&D for scheduled IOL.    Please see H&P for details.    Wendi Prieto MD

## 2023-10-02 NOTE — PROGRESS NOTES
1100 - Pt a , due 10/30 making her 36.0 weeks. Pt pregnant with Vickey Twins. Both IUGR with baby B being more severe.   1213 - US done at bedside by Dr. Prieto and Dr. Sloan. Baby A vertex and Baby B vertex.   1228 - SVE by Dr. Prieto. 50/-3. Orders received to place cytotec.   1301 - Cytotec placed for IOL. Plan to recheck in 4 hours.   165 - Dr. Prieto at bedside. SVE: 3/70/-3. Plan to begin pitocin. Pt requesting an epidural.    - Dr. Gutierrez at bedside. Epidural placed. Pt comfortable after placement.    - Pitocin began per order.    - Report to Ivy PARR.

## 2023-10-03 LAB
ACTION RH IMMUNE GLOB 8505RHG: NORMAL
ERYTHROCYTE [DISTWIDTH] IN BLOOD BY AUTOMATED COUNT: 35.9 FL (ref 35.9–50)
HCT VFR BLD AUTO: 33.3 % (ref 37–47)
HGB BLD-MCNC: 10.6 G/DL (ref 12–16)
IMMUNE ROSETTING TEST 8505FMH: NORMAL
MCH RBC QN AUTO: 24.2 PG (ref 27–33)
MCHC RBC AUTO-ENTMCNC: 31.8 G/DL (ref 32.2–35.5)
MCV RBC AUTO: 76 FL (ref 81.4–97.8)
NUMBER OF RH DOSES IND 8505RD: 1
PATHOLOGY CONSULT NOTE: NORMAL
PLATELET # BLD AUTO: 281 K/UL (ref 164–446)
PMV BLD AUTO: 10.5 FL (ref 9–12.9)
RBC # BLD AUTO: 4.38 M/UL (ref 4.2–5.4)
WBC # BLD AUTO: 14.9 K/UL (ref 4.8–10.8)

## 2023-10-03 PROCEDURE — 700102 HCHG RX REV CODE 250 W/ 637 OVERRIDE(OP): Performed by: OBSTETRICS & GYNECOLOGY

## 2023-10-03 PROCEDURE — 770002 HCHG ROOM/CARE - OB PRIVATE (112)

## 2023-10-03 PROCEDURE — 59410 OBSTETRICAL CARE: CPT | Mod: 22 | Performed by: OBSTETRICS & GYNECOLOGY

## 2023-10-03 PROCEDURE — 36415 COLL VENOUS BLD VENIPUNCTURE: CPT

## 2023-10-03 PROCEDURE — 85461 HEMOGLOBIN FETAL: CPT

## 2023-10-03 PROCEDURE — A9270 NON-COVERED ITEM OR SERVICE: HCPCS | Performed by: OBSTETRICS & GYNECOLOGY

## 2023-10-03 PROCEDURE — 700105 HCHG RX REV CODE 258: Performed by: STUDENT IN AN ORGANIZED HEALTH CARE EDUCATION/TRAINING PROGRAM

## 2023-10-03 PROCEDURE — 59409 OBSTETRICAL CARE: CPT

## 2023-10-03 PROCEDURE — 700102 HCHG RX REV CODE 250 W/ 637 OVERRIDE(OP): Performed by: STUDENT IN AN ORGANIZED HEALTH CARE EDUCATION/TRAINING PROGRAM

## 2023-10-03 PROCEDURE — 304965 HCHG RECOVERY SERVICES

## 2023-10-03 PROCEDURE — 700111 HCHG RX REV CODE 636 W/ 250 OVERRIDE (IP): Mod: JZ | Performed by: STUDENT IN AN ORGANIZED HEALTH CARE EDUCATION/TRAINING PROGRAM

## 2023-10-03 PROCEDURE — A9270 NON-COVERED ITEM OR SERVICE: HCPCS | Performed by: STUDENT IN AN ORGANIZED HEALTH CARE EDUCATION/TRAINING PROGRAM

## 2023-10-03 PROCEDURE — 85027 COMPLETE CBC AUTOMATED: CPT

## 2023-10-03 RX ORDER — SODIUM CHLORIDE, SODIUM LACTATE, POTASSIUM CHLORIDE, CALCIUM CHLORIDE 600; 310; 30; 20 MG/100ML; MG/100ML; MG/100ML; MG/100ML
INJECTION, SOLUTION INTRAVENOUS PRN
Status: DISCONTINUED | OUTPATIENT
Start: 2023-10-03 | End: 2023-10-04 | Stop reason: HOSPADM

## 2023-10-03 RX ORDER — IBUPROFEN 800 MG/1
800 TABLET ORAL EVERY 8 HOURS PRN
Status: DISCONTINUED | OUTPATIENT
Start: 2023-10-03 | End: 2023-10-04 | Stop reason: HOSPADM

## 2023-10-03 RX ORDER — ACETAMINOPHEN 500 MG
1000 TABLET ORAL EVERY 6 HOURS PRN
Status: DISCONTINUED | OUTPATIENT
Start: 2023-10-03 | End: 2023-10-04 | Stop reason: HOSPADM

## 2023-10-03 RX ORDER — DOCUSATE SODIUM 100 MG/1
100 CAPSULE, LIQUID FILLED ORAL 2 TIMES DAILY PRN
Status: DISCONTINUED | OUTPATIENT
Start: 2023-10-03 | End: 2023-10-04 | Stop reason: HOSPADM

## 2023-10-03 RX ADMIN — OXYTOCIN 125 ML/HR: 10 INJECTION, SOLUTION INTRAMUSCULAR; INTRAVENOUS at 02:30

## 2023-10-03 RX ADMIN — IBUPROFEN 800 MG: 800 TABLET, FILM COATED ORAL at 00:55

## 2023-10-03 RX ADMIN — OXYTOCIN 125 ML/HR: 10 INJECTION, SOLUTION INTRAMUSCULAR; INTRAVENOUS at 01:00

## 2023-10-03 RX ADMIN — ACETAMINOPHEN 1000 MG: 500 TABLET ORAL at 09:57

## 2023-10-03 RX ADMIN — IBUPROFEN 800 MG: 800 TABLET, FILM COATED ORAL at 17:05

## 2023-10-03 ASSESSMENT — PAIN DESCRIPTION - PAIN TYPE
TYPE: ACUTE PAIN

## 2023-10-03 NOTE — CARE PLAN
The patient is Stable - Low risk of patient condition declining or worsening    Shift Goals  Clinical Goals: VSS, breastfeeding, lochia WNL  Patient Goals: pain management with epidural  Family Goals: support    Progress made toward(s) clinical / shift goals:    Problem: Knowledge Deficit - Postpartum  Goal: Patient will verbalize and demonstrate understanding of self and infant care  Outcome: Progressing  Note: Education provided on lochia color and amount and when to call RN. Education on care of late- infants, frequency and duration of feeds, appropriate dress, skin to skin, and bundling when in open crib.      Problem: Altered Physiologic Condition  Goal: Patient physiologically stable as evidenced by normal lochia, palpable uterine involution and vitals within normal limits  Outcome: Progressing  Note: VSS, lochia WNL, fundus is firm 1 below uterus.        Patient is not progressing towards the following goals:

## 2023-10-03 NOTE — PROGRESS NOTES
"Post Partum Vaginal Delivery Note    Subjective: Doing well without significant complaints.  Normal lochia.    Vitals: /69   Pulse (!) 49   Temp 36.5 °C (97.7 °F) (Temporal)   Resp 18   Ht 1.676 m (5' 6\")   Wt 93.9 kg (207 lb)   LMP 2023   SpO2 96%   Breastfeeding Yes   BMI 33.41 kg/m²   Temp (24hrs), Av.4 °C (97.5 °F), Min:36.2 °C (97.1 °F), Max:36.6 °C (97.8 °F)      Exam:      GEN: Patient without distress.    Abdomen: soft, non-tender; fundus firm at/below umbilicus    Extremitie: no edema; calves non-tender    Delivery Blood Loss:      NATALIA Alvarenga [[id number]]   100      LONA Alvarenga [[id number]]   100  ml    Labs:  Recent Labs     10/02/23  1150   WBC 13.4*   RBC 4.09*   HEMOGLOBIN 10.1*   HEMATOCRIT 31.6*   MCV 77.3*   MCH 24.7*   RDW 36.5   PLATELETCT 296   MPV 10.2   NEUTSPOLYS 75.10*   LYMPHOCYTES 17.40*   MONOCYTES 6.90   EOSINOPHILS 0.00   BASOPHILS 0.10         Impression:   23 y.o.      Postpartum vaginal delivery    - routine postpartum care and maternal education    -  discharge tomorrow        Signed By:   Polly Sloan D.O.             "

## 2023-10-03 NOTE — PROGRESS NOTES
1900 Report received from KESHAV Evangelista.     Tree CHRISTINE CNM at bedside, AROM, mec fluid.    2315 Patient c/o increased pressure, SVE 10/100/+2 at this time. Patient moved to OR for delivery.    2335  of viable male infant. Apgars 8 and 9.    2337 Confirmation of vertex presentation of twin B with bedside US. AROM sac 2, clear fluid.    2341  of viable male infant. Apgars 8 and 9.    Placentas sent to pathology. Postpartum checks WNL. EBL at delivery 100 ml. QBL during recovery additional 100 ml, bleeding light.    0210 Ambulated to bathroom and able to void.    0220 Transferred to postpartum in stable condition. Report given to KESHAV London and KESHAV Do.

## 2023-10-03 NOTE — PROGRESS NOTES
"Labor Progress Note:      S:  Pt reports she is feeling some contractions    Vitals:    10/02/23 1115   BP: 107/68   Pulse: (!) 101   Temp: 36.5 °C (97.7 °F)   TempSrc: Temporal   Weight: 93.9 kg (207 lb)   Height: 1.676 m (5' 6\")       SVE: 3/70/-3    FHT:   Twin A:120/moderate variability/+accels/no decels  Twin B: 125/moderate variability/+accels, rare variable  toco:  ctx Q2-3 minutes      A/P: 23 y.o.  with di-di twin pregnancy @ 36w0d by LMP c/w 12 week U/S admitted for IOL for IUGR of both twins.  - labor: early, cervix now favorable. Start pitocin. Plan for epidural soon then AROM.   - FHT: cat 1/cat 1  - GBS: neg  -  Rh neg, RI    Wendi Prieto M.D.        "

## 2023-10-03 NOTE — L&D DELIVERY NOTE
VAGINAL DELIVERY NOTE - TWINS    The patient presented at 36w0d for IOL for IUGR Di/Di twins.  Induction was commenced with cytotec x 1 and then pitocin. She progressed to complete and was transferred to the OR for delivery.  She pushed for one contraction for a spontaneous vaginal delivery of LV male infant, apgars 8/9, wt 4lb 1oz for baby A.  Baby was delivered to belly and cord clamped and cut and handed off to waiting peds staff.  Vaginal exam performed and baby B found to be cephalic which was confirmed with US.  AROM performed with clear fluid.  Baby B delivered 6 min after baby A with gentle maternal expulsive efforts in OP presentation.  Baby was delivered to belly and cord clamped and cut.  Apgars 8/9, wt 4lb 9oz.  Perineum and vaginal noted to be intact.   Gases were not sent. The placenta did follow spontaneously. The lower uterine segment explored and clots removed.    EBL 100mL.    Polly Sloan D.O.  October 2, 2023

## 2023-10-03 NOTE — ANESTHESIA POSTPROCEDURE EVALUATION
Patient: Azael Alvarenga    Procedure Summary     Date: 10/02/23 Room / Location:     Anesthesia Start: 1722 Anesthesia Stop: 2341    Procedure: Labor Epidural Diagnosis:     Scheduled Providers:  Responsible Provider: Garrett Pinto D.O.    Anesthesia Type: epidural ASA Status: 2          Final Anesthesia Type: epidural  Last vitals  BP   Blood Pressure: 114/71    Temp   36.2 °C (97.1 °F)    Pulse   (!) 57   Resp   16    SpO2   96 %      Anesthesia Post Evaluation    Patient location during evaluation: floor  Patient participation: complete - patient participated  Level of consciousness: awake and alert    Airway patency: patent  Anesthetic complications: no  Cardiovascular status: hemodynamically stable  Respiratory status: acceptable  Hydration status: euvolemic    PONV: none          No notable events documented.     Nurse Pain Score: 0 (NPRS)

## 2023-10-03 NOTE — ANESTHESIA PREPROCEDURE EVALUATION
"Date: 10/02/23  Procedure: Labor Epidural       Anes H&P:  PAST MEDICAL HISTORY:   23 y.o. female who presents for labor epidural.  She has current and past medical problems significant for:    History reviewed. No pertinent past medical history.    SMOKING/ALCOHOL/RECREATIONAL DRUG USE:  Social History     Tobacco Use   • Smoking status: Never   • Smokeless tobacco: Never   Vaping Use   • Vaping Use: Never used   Substance Use Topics   • Alcohol use: Not Currently   • Drug use: Never     Social History     Substance and Sexual Activity   Drug Use Never       PAST SURGICAL HISTORY:  Past Surgical History:   Procedure Laterality Date   • APPENDECTOMY     • OVARIAN CYSTECTOMY         ALLERGIES:   No Known Allergies    MEDICATIONS:  No current facility-administered medications on file prior to encounter.     Current Outpatient Medications on File Prior to Encounter   Medication Sig Dispense Refill   • Prenatal Vit-Fe Fumarate-FA (PRENATAL 1+1 PO) Take  by mouth. (Patient not taking: Reported on 9/29/2023)         LABS:  Lab Results   Component Value Date/Time    HEMOGLOBIN 10.1 (L) 10/02/2023 1150    HEMATOCRIT 31.6 (L) 10/02/2023 1150    WBC 13.4 (H) 10/02/2023 1150     No results found for: \"SODIUM\", \"POTASSIUM\", \"CHLORIDE\", \"CO2\", \"GLUCOSE\", \"BUN\", \"CALCIUM\"      PREVIOUS ANESTHETICS: See EMR  __________________________________________      Relevant Problems   No relevant active problems       Physical Exam    Airway   Mallampati: II  TM distance: >3 FB  Neck ROM: full       Cardiovascular - normal exam  Rhythm: regular  Rate: normal  (-) murmur     Dental - normal exam           Pulmonary - normal exam  Breath sounds clear to auscultation     Abdominal    Neurological - normal exam                 Anesthesia Plan    ASA 2       Plan - epidural   Neuraxial block will be labor analgesia                  Pertinent diagnostic labs and testing reviewed    Informed Consent:    Anesthetic plan and risks discussed with " patient.

## 2023-10-03 NOTE — CARE PLAN
The patient is Stable - Low risk of patient condition declining or worsening    Shift Goals  Clinical Goals: progress in cervical dilation  Patient Goals: pain management with epidural  Family Goals: support    Progress made toward(s) clinical / shift goals:  regularly bautista on pitocin.    Patient is not progressing towards the following goals: n/a      Problem: Risk for Infection and Impaired Wound Healing  Goal: Patient will remain free from infection  Outcome: Progressing     Problem: Pain  Goal: Patient's pain will be alleviated or reduced to the patient’s comfort goal  Outcome: Progressing

## 2023-10-03 NOTE — ANESTHESIA PROCEDURE NOTES
Epidural Block    Date/Time: 10/2/2023 5:27 PM    Performed by: Garrett Pinto D.O.  Authorized by: Garrett Pinto D.O.    Patient Location:  OB  Start Time:  10/2/2023 5:27 PM  Reason for Block: labor analgesia    patient identified, IV checked, site marked, risks and benefits discussed, surgical consent, monitors and equipment checked and pre-op evaluation    Patient Position:  Sitting  Prep: ChloraPrep, patient draped and sterile technique    Monitoring:  Blood pressure, continuous pulse oximetry and heart rate  Approach:  Midline  Location:  L3-L4  Injection Technique:  TODD saline  Skin infiltration:  Lidocaine  Strength:  1%  Dose:  3ml  Needle Type:  Tuohy  Needle Gauge:  17 G  Needle Length:  3.5 in  Loss of resistance::  5  Catheter Size:  19 G  Catheter at Skin Depth:  11

## 2023-10-03 NOTE — ANESTHESIA TIME REPORT
Anesthesia Start and Stop Event Times     Date Time Event    10/2/2023 1722 Anesthesia Start     1725 Ready for Procedure     2341 Anesthesia Stop        Responsible Staff  10/02/23    Name Role Begin End    Garrett Pinto D.O. Anesth 1722 2341        Overtime Reason:  no overtime (within assigned shift)    Comments:

## 2023-10-03 NOTE — PROGRESS NOTES
Pt arrived to room in wheelchair accompanied by KESHAV Haynes from labor and delivery. Report received, assumed care from labor. Oriented to room, call light, emergency light. Assessment completed, fundus firm one below umbilicus, lochia light.  Plan of care reviewed. Bed is locked and in the lowest position.   Began education on care of late  infant, including 3 step feeding plan. MOB would like to breastfeed infants, pump, and desires formula for supplementation.   Denies pain at this time, will call if interventions are needed.

## 2023-10-03 NOTE — PROGRESS NOTES
S: Pt is laying in bed upon entry to room. S/p epidural placement. Discussed AROM if SVE appropriate. .      O:    Vitals:    10/02/23 2132 10/02/23 2148 10/02/23 2202 10/02/23 2218   BP: 106/67 107/66 112/69 115/65   Pulse: 71 78 80 76   Resp:       Temp:       TempSrc:       SpO2:       Weight:       Height:         Baby A Baseline 140, pos accels, no decels, moderate variability  Baby B Baseline 135, pos accels, intermittent variable decels, moderate variability        [place]: Contractions q 1-5 minutes, moderate to palpation, pitocin @ 2 milliunits        SVE: /-2 AROM with moderate amount of meconium stained fluid returned.      A/P:    1.  IUP @ 36w0d   2.  Cat II FHTs    3.  Di/Di Twins with FGR- plan for delivery. Educated on meconium stained amniotic fluid.   4.  Early Labor- continue pitocin per protocol but hold for one hour. Anticipate .      CINDA Felix, RANDYM

## 2023-10-04 ENCOUNTER — PHARMACY VISIT (OUTPATIENT)
Dept: PHARMACY | Facility: MEDICAL CENTER | Age: 23
End: 2023-10-04
Payer: COMMERCIAL

## 2023-10-04 VITALS
BODY MASS INDEX: 33.27 KG/M2 | HEART RATE: 61 BPM | WEIGHT: 207 LBS | SYSTOLIC BLOOD PRESSURE: 131 MMHG | TEMPERATURE: 98.2 F | HEIGHT: 66 IN | DIASTOLIC BLOOD PRESSURE: 78 MMHG | OXYGEN SATURATION: 93 % | RESPIRATION RATE: 18 BRPM

## 2023-10-04 PROBLEM — O30.049 DICHORIONIC DIAMNIOTIC TWIN GESTATION: Status: RESOLVED | Noted: 2023-07-25 | Resolved: 2023-10-04

## 2023-10-04 PROBLEM — O09.93 SUPERVISION OF HIGH RISK PREGNANCY IN THIRD TRIMESTER: Status: RESOLVED | Noted: 2023-08-22 | Resolved: 2023-10-04

## 2023-10-04 PROCEDURE — A9270 NON-COVERED ITEM OR SERVICE: HCPCS | Performed by: OBSTETRICS & GYNECOLOGY

## 2023-10-04 PROCEDURE — 99999 PR NO CHARGE: CPT | Performed by: OBSTETRICS & GYNECOLOGY

## 2023-10-04 PROCEDURE — 700102 HCHG RX REV CODE 250 W/ 637 OVERRIDE(OP): Performed by: OBSTETRICS & GYNECOLOGY

## 2023-10-04 PROCEDURE — RXMED WILLOW AMBULATORY MEDICATION CHARGE: Performed by: NURSE PRACTITIONER

## 2023-10-04 RX ORDER — IBUPROFEN 800 MG/1
800 TABLET ORAL EVERY 8 HOURS PRN
Qty: 30 TABLET | Refills: 0 | Status: SHIPPED | OUTPATIENT
Start: 2023-10-04

## 2023-10-04 RX ORDER — ACETAMINOPHEN 500 MG
1000 TABLET ORAL EVERY 6 HOURS PRN
Qty: 30 TABLET | Refills: 0 | COMMUNITY
Start: 2023-10-04

## 2023-10-04 RX ADMIN — IBUPROFEN 800 MG: 800 TABLET, FILM COATED ORAL at 04:52

## 2023-10-04 ASSESSMENT — EDINBURGH POSTNATAL DEPRESSION SCALE (EPDS)
I HAVE BEEN SO UNHAPPY THAT I HAVE BEEN CRYING: NO, NEVER
I HAVE FELT SAD OR MISERABLE: NO, NOT AT ALL
I HAVE FELT SCARED OR PANICKY FOR NO GOOD REASON: NO, NOT AT ALL
I HAVE BLAMED MYSELF UNNECESSARILY WHEN THINGS WENT WRONG: NO, NEVER
I HAVE BEEN ANXIOUS OR WORRIED FOR NO GOOD REASON: NO, NOT AT ALL
I HAVE BEEN ABLE TO LAUGH AND SEE THE FUNNY SIDE OF THINGS: AS MUCH AS I ALWAYS COULD
THINGS HAVE BEEN GETTING ON TOP OF ME: NO, I HAVE BEEN COPING AS WELL AS EVER
I HAVE BEEN SO UNHAPPY THAT I HAVE HAD DIFFICULTY SLEEPING: NOT AT ALL
THE THOUGHT OF HARMING MYSELF HAS OCCURRED TO ME: NEVER
I HAVE LOOKED FORWARD WITH ENJOYMENT TO THINGS: AS MUCH AS I EVER DID

## 2023-10-04 ASSESSMENT — PAIN DESCRIPTION - PAIN TYPE: TYPE: ACUTE PAIN

## 2023-10-04 NOTE — CARE PLAN
The patient is Stable - Low risk of patient condition declining or worsening    Shift Goals  Clinical Goals: Maintain fundus firm, lochia light; pain management; discharge home  Patient Goals: discharge  Family Goals:     Progress made toward(s) clinical / shift goals:  MET

## 2023-10-04 NOTE — DISCHARGE SUMMARY
Discharge Summary:      Azael Alvarenga    Admit Date:   10/2/2023  Discharge Date:  10/4/2023     Admitting diagnosis:  Labor and delivery, indication for care [O75.9]  Discharge Diagnosis: Status post vaginal, spontaneous.  Pregnancy Complications: Di/Di twins, IUGR in both  Tubal Ligation:  N\A        History:  History reviewed. No pertinent past medical history.  OB History    Para Term  AB Living   4 3 2 1 1 4   SAB IAB Ectopic Molar Multiple Live Births   1       1 4      # Outcome Date GA Lbr Jose Luis/2nd Weight Sex Delivery Anes PTL Lv   4A  10/02/23 36w0d 06:16 / 00:20 1.84 kg (4 lb 0.9 oz) M Vag-Spont EPI N MINESH   4B  10/02/23 36w0d 06:16 / 00:26 2.06 kg (4 lb 8.7 oz) M Vag-Spont EPI N MINESH   3 Term 22   2.637 kg (5 lb 13 oz) M Vag-Spont   MINESH   2 Term 08/10/20   3.232 kg (7 lb 2 oz) M Vag-Spont   MINESH   1 SAB      SAB           Patient has no known allergies.  Patient Active Problem List    Diagnosis Date Noted     (normal spontaneous vaginal delivery) 10/04/2023    Postpartum care following vaginal delivery 10/04/2023    Delivery outcome of twins, both liveborn 10/04/2023    Labor and delivery, indication for care 10/02/2023    History of intrauterine growth restriction in prior pregnancy, currently pregnant 2023        Hospital Course:   23 y.o. , now para 4, was admitted with the above mentioned diagnosis, underwent Induction of Labor, vaginal, spontaneous. Patient postpartum course was unremarkable, with progressive advancement in diet , ambulation and toleration of oral analgesia. Patient without complaints today and desires discharge.      Vitals:    10/03/23 0544 10/03/23 1000 10/03/23 1759 10/04/23 0519   BP: 119/69 112/72 121/74 131/78   Pulse: (!) 49 73 69 61   Resp: 18 18 18 18   Temp: 36.5 °C (97.7 °F) 36.4 °C (97.6 °F) 36.6 °C (97.9 °F) 36.8 °C (98.2 °F)   TempSrc: Temporal Temporal Temporal Temporal   SpO2: 96% 96% 97% 93%   Weight:        Height:           Current Facility-Administered Medications   Medication Dose    lactated ringers infusion      PRN oxytocin (PITOCIN) (20 Units/1000 mL) PRN for excessive uterine bleeding - See Admin Instr  125-999 mL/hr    docusate sodium (Colace) capsule 100 mg  100 mg    ibuprofen (Motrin) tablet 800 mg  800 mg    acetaminophen (Tylenol) tablet 1,000 mg  1,000 mg    tetanus-dipth-acell pertussis (Tdap) inj 0.5 mL  0.5 mL    measles, mumps and rubella vaccine (Mmr) injection 0.5 mL  0.5 mL       Exam:  Breast Exam: Inspection negative. No nipple discharge or bleeding. No masses or nodularity palpable  Abdomen: Abdomen soft, non-tender. BS normal. No masses,  No organomegaly  Fundus Non Tender: yes  Incision: none  Perineum: perineum intact  Extremity: extremities, peripheral pulses and reflexes normal, no edema, redness or tenderness in the calves or thighs, Homans sign is negative, no sign of DVT, feet normal, good pulses, normal color, temperature and sensation     Labs:  Recent Labs     10/02/23  1150 10/03/23  1148   WBC 13.4* 14.9*   RBC 4.09* 4.38   HEMOGLOBIN 10.1* 10.6*   HEMATOCRIT 31.6* 33.3*   MCV 77.3* 76.0*   MCH 24.7* 24.2*   MCHC 32.0* 31.8*   RDW 36.5 35.9   PLATELETCT 296 281   MPV 10.2 10.5        Activity:   Discharge to home  Pelvic Rest x 6 weeks    Assessment:  normal postpartum course  Discharge Assessment: Taking adequate diet and fluids, no heavy bleeding or foul discharge. Voiding without difficulty     Follow up: .[facility] or [facility] in 5 weeks for vaginal.      Discharge Meds:   Current Outpatient Medications   Medication Sig Dispense Refill    acetaminophen (TYLENOL) 500 MG Tab Take 2 Tablets by mouth every 6 hours as needed for Mild Pain. 30 Tablet 0    ibuprofen (MOTRIN) 800 MG Tab Take 1 Tablet by mouth every 8 hours as needed for Moderate Pain. 30 Tablet 0     Pt need to RT [facility] or ER if any of the following occur:  Fever over 100,5  Severe abd pain  Red streaks or  painful masses in the breasts  Foul smelling d/c or lochia  Heavy vaginal bleeding saturating a pad per hour  S/s of PP depression   S/s of PP PIH  Unsure of desired BCM    Dia New C.N.M.

## 2023-10-04 NOTE — DISCHARGE PLANNING
Discharge Planning Assessment Post Partum    Reason for Referral: Norbert Juarez   Address: [id number] [place] [address]   Type of Living Situation:Stable   Mom Diagnosis: Postpartum  Baby Diagnosis:  Twins   Primary Language: English     Name of Baby: Rocio Nassar  Father of the Baby: Ye Nassar  Involved in baby’s care? yes  Contact Information: [phone]    Prenatal Care: Yes  Mom's PCP: None  PCP for new baby:List given    Support System:Yes  Coping/Bonding between mother & baby: MOb coping/bonding   Source of Feeding: Breast  Supplies for Infant: Yes    Mom's Insurance: Medicaid   Baby Covered on Insurance:Yes  Mother Employed/School: None  Other children in the home/names & ages: 3 yr old and 1.5 yr old    Financial Hardship/Income: None   Mom's Mental status: Stable   Services used prior to admit: None    CPS History: None  Psychiatric History: None  Domestic Violence History: None  Drug/ETOH History: None    Resources Provided:  provided information on Norbert Juarez   Referrals Made: Norbert Juarez      Clearance for Discharge: Babies are cleared to discharge with MOB and FOB when medically cleared

## 2023-10-04 NOTE — PROGRESS NOTES
0700- Bedside report received from KESHAV London.  Patient denied needs.  1050- Feeding infant at this time.  1125- Patient assessment done.  Patient reported she is voiding without difficulty and passing flatus.  Patient denied dizziness and reported she is walking without difficulty.  Patient denied need for pain intervention at this time.  Reviewed plan of care.  Patient verbalized understanding.  Patient stated desire for discharge home today and was encouraged to read the written patient discharge education/instruction sheet.  FOB at bedside.  1350- Patient stated she read the written patient discharge education/instruction sheet and has no questions.  Discharge instructions reviewed with patient who verbalized understanding and stated she has no questions.  Discharge medication, ibuprofen,  handed to patient after named verified and instructions reviewed.  Patient verbalized understanding.  Patient will call when ready for escort out to the vehicle.  1412- Patient stated that she is ready for discharge.  Patient declined offer for wheelchair and was discharged to home, no change noted in condition, accompanied by FOB.  Infants not discharged home today.

## 2023-10-04 NOTE — PROGRESS NOTES
0708- Report received from KESHAV London.  Assumed care of patient.  Patient is sleeping at this time.  0945- Patient assessment done.  Patient reported she is voiding without difficulty and passing flatus.  Patient denied dizziness and reported she is walking without difficulty.  Discussed pain management plan, to include MD orders for prn pain medication.  Reviewed plan of care.  Patient verbalized understanding.   1000- Patient instructed on use of breast pump, to include pump settings for speed, suction, and length of pumping.  Discussed cleaning procedures and patient given hand out on cleaning instructions.  Patient verbalized understanding.

## 2023-10-04 NOTE — PROGRESS NOTES
1845 Obtained report from day shift nurse Karol  2000 Pt assessment completed. No abnormal findings noted. All pt needs and questions addressed at this time. POC for twins discussed with POB. POB voiced understanding and agreed with POC tonight.

## 2023-10-04 NOTE — DISCHARGE INSTRUCTIONS
PATIENT DISCHARGE EDUCATION INSTRUCTION SHEET    REASONS TO CALL YOUR OBSTETRICIAN  Persistent fever, shaking, chills (Temperature higher than 100.4) may indicate you have an infection  Heavy bleeding: soaking more than 1 pad per hour; Passing clots an egg-sized clot or bigger may mean you have an postpartum hemorrhage  Foul odor from vagina or bad smelling or discolored discharge or blood  Breast infection (Mastitis symptoms); breast pain, chills, fever, redness or red streaks, may feel flu like symptoms  Urinary pain, burning or frequency  Incision that is not healing, increased redness, swelling, tenderness or pain, or any pus from episiotomy or  site may mean you have an infection  Redness, swelling, warmth, or painful to touch in the calf area of your leg may mean you have a blood clot  Severe or intensified depression, thoughts or feelings of wanting to hurt yourself or someone else   Pain in chest, obstructed breathing or shortness of breath (trouble catching your breath) may mean you are having a postpartum complication. Call your provider immediately   Headache that does not get better, even after taking medicine, a bad headache with vision changes or pain in the upper right area of your belly may mean you have high blood pressure or post birth preeclampsia. Call your provider immediately    HAND WASHING  All family and friends should wash their hands:  Before and after holding the baby  Before feeding the baby  After using the restroom or changing the baby's diaper    WOUND CARE  Ask your physician for additional care instructions. In general:  Episiotomy/Laceration  May use libra-spray bottle, witch hazel pads and dermaplast spray for comfort  Use libra-spray bottle after urinating to cleanse perineal area  To prevent burning during urination spray libra-water bottle on labial area   Pat perineal area dry until episiotomy/laceration is healed  Continue to use libra-bottle until bleeding stops as  needed  If have a 2nd degree laceration or greater, a Sitz bath can offer relief from soreness, burning, and inflammation   Sitz Bath   Sit in 6 inches of warm water and soak laceration as needed until the laceration heals    VAGINAL CARE AND BLEEDING  Nothing inside vagina for 6 weeks:   No sexual intercourse, tampons or douching  Bleeding may continue for 2-4 weeks. Amount and color may vary  Soaking 1 pad or more in an hour for several hours is considered heavy bleeding  Passing large egg sized blood clots can be concerning  If you feel like you have heavy bleeding or are having increasing amount of blood clots call your Obstetrician immediately  If you begin feeling faint upon standing, feeling sick to your stomach, have clammy skin, a really fast heartbeat, have chills, start feeling confused, dizzy, sleepy or weak, or feeling like you're going to faint call your Obstetrician immediately    HYPERTENSION   Preeclampsia or gestational hypertension are types of high blood pressure that only pregnant women can get. It is important for you to be aware of symptoms to seek early intervention and treatment. If you have any of these symptoms immediately call your Obstetrician    Vision changes or blurred vision   Severe headache or pain that is unrelieved with medication and will not go away  Persistent pain in upper abdomen or shoulder   Increased swelling of face, feet, or hands  Difficulty breathing or shortness of breath at rest  Urinating less than usual    URINATION AND BOWEL MOVEMENTS  Eating more fiber (bran cereal, fruits, and vegetables) and drinking plenty of fluids will help to avoid constipation  Urinary frequency and urgency after childbirth is normal  If you experience any urinary pain, burning or frequency call your provider    BIRTH CONTROL  It is possible to become pregnant at any time after delivery and while breastfeeding  Plan to discuss a method of birth control with your physician at your post  "delivery follow up visit    POSTPARTUM BLUES  During the first few days after birth, you may experience a sense of the \"blues\" which may include impatience, irritability or even crying. These feelings come and go quickly. However, as many as 1 in 10 women experience emotional symptoms known as postpartum depression.     POSTPARTUM DEPRESSION  May start as early as the second or third day after delivery or take several weeks or months to develop. Symptoms of \"blues\" are present, but are more intense: Crying spells; loss of appetite; feelings of hopelessness or loss of control; fear of touching the baby; over concern or no concern at all about the baby; little or no concern about your own appearance/caring for yourself; and/or inability to sleep or excessive sleeping. Contact your Obstetrician if you are experiencing any of these symptoms     PREVENTING SHAKEN BABY  If you are angry or stressed, PUT THE BABY IN THE CRIB, step away, take some deep breaths, and wait until you are calm to care for the baby. DO NOT SHAKE THE BABY. You are not alone, call a supporter for help.  [facility] 24/7 crisis call line (347-912-9366) or (1-892.941.9065)  You can also text them, text \"ANSWER\" (868925)  "

## 2023-10-04 NOTE — LACTATION NOTE
I provided Azael a manual breast pump with a 22.5 mm flange insert. She will use this until she has access to her personal breast pump. She reports that her flange size at home is a 15 mm and works well. She has used the [employer] manual pump before.    Parents were educated on milk production, milk storage and handling and skin to skin time with babies. Referenced to the moms of multiples web site for breast feeding support and handouts provided with other community resources.

## 2023-10-13 ENCOUNTER — TELEPHONE (OUTPATIENT)
Dept: OBGYN | Facility: CLINIC | Age: 23
End: 2023-10-13
Payer: COMMERCIAL

## 2023-10-13 NOTE — TELEPHONE ENCOUNTER
Pt called in. Was worried because she delivered baby about 11 days ago and today has a 101.3 degree fever and fainted earlier. No other symptoms.   Consulted with Dr odell and she stated she needs to go into l&d to get checked out due to no avilable appts this afternoon.    Pt stated she lives 4 hours away and will head to her local er. No further questions at this time

## 2024-01-19 ENCOUNTER — TELEPHONE (OUTPATIENT)
Dept: OBGYN | Facility: CLINIC | Age: 24
End: 2024-01-19
Payer: COMMERCIAL

## 2024-01-19 NOTE — TELEPHONE ENCOUNTER
Pt called in asking if there is anything to prescribe for more milk production   After speaking to [facility] unfortunately we do not prescribe anything anymore  Pt was recommended mothers milk tea, fenugreek seed, more frequent pumping and possibly an appt with lactation  Pt understood all options but stated she lived 4 hrs away so lactation appt  wasn't the best option  Pt also had insurance change questions and was then transferred to [facility]'s  All questions answered

## 2025-02-08 ENCOUNTER — HOSPITAL ENCOUNTER (EMERGENCY)
Facility: MEDICAL CENTER | Age: 25
End: 2025-02-08
Attending: EMERGENCY MEDICINE
Payer: COMMERCIAL

## 2025-02-08 VITALS
WEIGHT: 160 LBS | BODY MASS INDEX: 25.71 KG/M2 | HEIGHT: 66 IN | RESPIRATION RATE: 16 BRPM | OXYGEN SATURATION: 97 % | DIASTOLIC BLOOD PRESSURE: 60 MMHG | HEART RATE: 73 BPM | SYSTOLIC BLOOD PRESSURE: 112 MMHG | TEMPERATURE: 97.4 F

## 2025-02-08 DIAGNOSIS — R55 SYNCOPE, UNSPECIFIED SYNCOPE TYPE: ICD-10-CM

## 2025-02-08 DIAGNOSIS — R55 RECURRENT SYNCOPE: ICD-10-CM

## 2025-02-08 LAB
ANION GAP SERPL CALC-SCNC: 10 MMOL/L (ref 7–16)
BASOPHILS # BLD AUTO: 0.4 % (ref 0–1.8)
BASOPHILS # BLD: 0.04 K/UL (ref 0–0.12)
BUN SERPL-MCNC: 16 MG/DL (ref 8–22)
CALCIUM SERPL-MCNC: 9.4 MG/DL (ref 8.5–10.5)
CHLORIDE SERPL-SCNC: 104 MMOL/L (ref 96–112)
CO2 SERPL-SCNC: 24 MMOL/L (ref 20–33)
CREAT SERPL-MCNC: 0.84 MG/DL (ref 0.5–1.4)
EKG IMPRESSION: NORMAL
EOSINOPHIL # BLD AUTO: 0.08 K/UL (ref 0–0.51)
EOSINOPHIL NFR BLD: 0.7 % (ref 0–6.9)
ERYTHROCYTE [DISTWIDTH] IN BLOOD BY AUTOMATED COUNT: 42.5 FL (ref 35.9–50)
GFR SERPLBLD CREATININE-BSD FMLA CKD-EPI: 98 ML/MIN/1.73 M 2
GLUCOSE SERPL-MCNC: 116 MG/DL (ref 65–99)
HCT VFR BLD AUTO: 47.9 % (ref 37–47)
HGB BLD-MCNC: 15.7 G/DL (ref 12–16)
IMM GRANULOCYTES # BLD AUTO: 0.04 K/UL (ref 0–0.11)
IMM GRANULOCYTES NFR BLD AUTO: 0.4 % (ref 0–0.9)
LYMPHOCYTES # BLD AUTO: 2.81 K/UL (ref 1–4.8)
LYMPHOCYTES NFR BLD: 26 % (ref 22–41)
MCH RBC QN AUTO: 27.8 PG (ref 27–33)
MCHC RBC AUTO-ENTMCNC: 32.8 G/DL (ref 32.2–35.5)
MCV RBC AUTO: 84.9 FL (ref 81.4–97.8)
MONOCYTES # BLD AUTO: 0.97 K/UL (ref 0–0.85)
MONOCYTES NFR BLD AUTO: 9 % (ref 0–13.4)
NEUTROPHILS # BLD AUTO: 6.85 K/UL (ref 1.82–7.42)
NEUTROPHILS NFR BLD: 63.5 % (ref 44–72)
NRBC # BLD AUTO: 0 K/UL
NRBC BLD-RTO: 0 /100 WBC (ref 0–0.2)
PLATELET # BLD AUTO: 388 K/UL (ref 164–446)
PMV BLD AUTO: 10.4 FL (ref 9–12.9)
POTASSIUM SERPL-SCNC: 3.8 MMOL/L (ref 3.6–5.5)
RBC # BLD AUTO: 5.64 M/UL (ref 4.2–5.4)
SODIUM SERPL-SCNC: 138 MMOL/L (ref 135–145)
TSH SERPL DL<=0.005 MIU/L-ACNC: 1.54 UIU/ML (ref 0.38–5.33)
WBC # BLD AUTO: 10.8 K/UL (ref 4.8–10.8)

## 2025-02-08 PROCEDURE — 80048 BASIC METABOLIC PNL TOTAL CA: CPT

## 2025-02-08 PROCEDURE — 84443 ASSAY THYROID STIM HORMONE: CPT

## 2025-02-08 PROCEDURE — 99284 EMERGENCY DEPT VISIT MOD MDM: CPT

## 2025-02-08 PROCEDURE — 93005 ELECTROCARDIOGRAM TRACING: CPT | Mod: TC | Performed by: EMERGENCY MEDICINE

## 2025-02-08 PROCEDURE — 85025 COMPLETE CBC W/AUTO DIFF WBC: CPT

## 2025-02-08 PROCEDURE — 93005 ELECTROCARDIOGRAM TRACING: CPT | Mod: TC

## 2025-02-08 PROCEDURE — 36415 COLL VENOUS BLD VENIPUNCTURE: CPT

## 2025-02-09 NOTE — ED NOTES
Bedside report received from KESHAV Vasques. Pt alert&o. Resp normal/unlabored on RA. Bed side rails up/in low position. Pt updated to POC and all questions answered.

## 2025-02-09 NOTE — ED TRIAGE NOTES
"Chief Complaint   Patient presents with    Syncope     LOC 0930 today, unwitnessed, possible LOC 10sec, no head strike, states hx of syncopal episodes with unknown origin, hx Graves disease        Ambulated to triage with partner for above complaint.     History reviewed. No pertinent past medical history.    EKG protocols ordered, completed. Pt educated of triage process and possible wait times. Pt informed to contact staff if status changes or with any developing concerns, pt returned to lobby.     /84   Pulse 92   Temp 36.3 °C (97.4 °F) (Temporal)   Resp 18   Ht 1.676 m (5' 6\")   Wt 72.6 kg (160 lb)   SpO2 98%   BMI 25.82 kg/m²    "

## 2025-02-09 NOTE — ED PROVIDER NOTES
ER Provider Note    Scribed for Sushil Valverde M.d. by Mayra Alvarado. 2/8/2025  5:56 PM    Primary Care Provider: Pcp Pt States None    CHIEF COMPLAINT  Chief Complaint   Patient presents with    Syncope     LOC 0930 today, unwitnessed, possible LOC 10sec, no head strike, states hx of syncopal episodes with unknown origin, hx Graves disease     EXTERNAL RECORDS REVIEWED  All records reviewed. Neither inpatient nor outpatient notes show any thyroid imaging or lab work.     HPI/ROS  LIMITATION TO HISTORY   Select: : None    OUTSIDE HISTORIAN(S):  None.    Azael Alvarenga is a 24 y.o. female who has history of Graves Disease presents to the ED for evaluation after a unwitnessed syncopal episode onset 9:30 AM this morning. The patient reports that she started to feel dizzy today and then had a syncopal episode that lasted about 10 seconds. She describes that she had a similar episode last week. The patient mentions that both times, she was reaching above her head while standing. Today, the patient denies any head strike. The patient denies any cough, congestion, fever. She notes that she takes methimazole and propranolol for her Graves disease. She denies any history of anemia.  The Graves' disease diagnosis and the medications are new with in the past 6 months or so.  [place] from here.  It does not sound like she has seen endocrinology.  She has reassuring vital signs, but blood pressure is on the low side of normal, about 110 systolic.  She reports having 4 children, and is also breast-feeding.  Sleep, and self-care sound like a current challenge.  She generally feels well.    PAST MEDICAL HISTORY  The patient reports history of Graves disease.     SURGICAL HISTORY  Past Surgical History:   Procedure Laterality Date    APPENDECTOMY      OVARIAN CYSTECTOMY         FAMILY HISTORY  Family History   Problem Relation Age of Onset    No Known Problems  "Mother     No Known Problems Father        SOCIAL HISTORY   reports that she has never smoked. She has never used smokeless tobacco. She reports current alcohol use. She reports that she does not use drugs.      CURRENT MEDICATIONS  Discharge Medication List as of 2/8/2025  7:45 PM        CONTINUE these medications which have NOT CHANGED    Details   acetaminophen (TYLENOL) 500 MG Tab Take 2 Tablets by mouth every 6 hours as needed for Mild Pain., Disp-30 Tablet, R-0, OTC      ibuprofen (MOTRIN) 800 MG Tab Take 1 Tablet by mouth every 8 hours as needed for Moderate Pain., Disp-30 Tablet, R-0, Normal         Methimazole, propranolol        ALLERGIES  Patient has no known allergies.      PHYSICAL EXAM  VITAL SIGNS: /84   Pulse 92   Temp 36.3 °C (97.4 °F) (Temporal)   Resp 18   Ht 1.676 m (5' 6\")   Wt 72.6 kg (160 lb)   SpO2 98%   BMI 25.82 kg/m²     Pulse ox interpretation: I interpret this pulse ox as normal.  Constitutional: Alert in no apparent distress.  HENT: No signs of trauma, Bilateral external ears normal, Nose normal.   Eyes: Conjunctiva normal, Non-icteric.   Neck: Normal range of motion, Supple, No stridor.   Lymphatic: No lymphadenopathy noted.   Cardiovascular: Regular rate and rhythm, no murmurs.   Thorax & Lungs: Normal breath sounds, No respiratory distress, No wheezing  Abdomen: Bowel sounds normal, Soft, No tenderness, No masses, No pulsatile masses. No peritoneal signs.  Skin: Warm, Dry, No erythema, No rash.   Back: No midline bony tenderness.   Extremities: Intact distal pulses, No edema, No cyanosis.  Musculoskeletal: Good range of motion in all major joints. No or major deformities noted.   Neurologic: Alert , Normal motor function, Normal sensory function, No focal deficits noted.   Psychiatric: Affect normal, Judgment normal, Mood normal.       DIAGNOSTIC STUDIES    Labs:   Labs Reviewed   CBC WITH DIFFERENTIAL - Abnormal; Notable for the following components:       Result Value "    RBC 5.64 (*)     Hematocrit 47.9 (*)     Monos (Absolute) 0.97 (*)     All other components within normal limits   BASIC METABOLIC PANEL - Abnormal; Notable for the following components:    Glucose 116 (*)     All other components within normal limits   TSH WITH REFLEX TO FT4   ESTIMATED GFR       EKG:   I have independently interpreted this EKG  Results for orders placed or performed during the hospital encounter of 25   EKG   Result Value Ref Range    Report       [facility].    Test Date:  2025  Pt Name:    BELLA MAGANA                  Department: ER  MRN:        [id number]                      Room:  Gender:     Female                       Technician: [id number]  :        2000                   Requested By:ER TRIAGE PROTOCOL  Order #:    [id number]                    Reading MD: SAMSON ALCOCER MD    Measurements  Intervals                                Axis  Rate:       83                           P:          89  NE:         150                          QRS:        83  QRSD:       87                           T:          60  QT:         339  QTc:        399    Interpretive Statements  Sinus rhythm  Baseline wander in lead(s) III  No previous ECG available for comparison  Electronically Signed On 2025 17:55:50 PST by SAMSON ALCOCER MD           COURSE & MEDICAL DECISION MAKING     INITIAL ASSESSMENT, COURSE AND PLAN  Care Narrative:       5:56 PM Patient presents to the ED after a syncopal episode. Patient evaluated at bedside and discussed plan of care, including performing an EKG and lab work. Ordered for an EKG, BMP and CBC w/ Diff. to evaluate her symptoms. Differential diagnoses include but not limited to: vasovagal syncope, thyroid dysregulation, anemia, electrolyte abnormality, dehydration, arrhythmia.     7:50 PM the patient's labs are reassuring.  She is been kept on cardiac monitoring, with no sign of arrhythmia.  Her blood pressure has  been about the same, low side of normal.  No thyroid dysfunction,  low risk [place] syncope score, documented elsewhere.  0.  I do think it is a chance that her propranolol is unnecessary, since she is now maintained on methimazole.  I recommended that she speak with her prescribing doctor to consider discontinuing propranolol.  If she would like, I think a Holter monitor would be reasonable to consider.  I have placed a referral to cardiology as well.  She will return for worsening symptoms.    ADDITIONAL PROBLEM MANAGED    DISPOSITION AND DISCUSSIONS    Escalation of care considered, and ultimately not performed: acute inpatient care management, however at this time, the patient is most appropriate for outpatient management.    Barriers to care at this time, including but not limited to:  None known .     Decision tools and prescription drugs considered including, but not limited to: Medication modification performed insomuch as I recommended discussion of stopping propranolol with her PCP .     The patient will return for new or worsening symptoms and is stable at the time of discharge.    The patient is referred to a primary physician for blood pressure management, diabetic screening, and for all other preventative health concerns.      DISPOSITION:  Patient will be discharged home in stable condition.    FOLLOW UP:  [facility], Emergency Dept  [address] [place] [id number] [phone]    If symptoms worsen      OUTPATIENT MEDICATIONS:  Discharge Medication List as of 2/8/2025  7:45 PM            FINAL DIAGNOSIS  1. Syncope, unspecified syncope type    2. Recurrent syncope      Mayra AL (Jah), am scribing for, and in the presence of, Sushil Valverde M.D..    Electronically signed by: Mayra Forbes), 2/8/2025    Sushil AL M.D. personally performed the services described in this documentation, as scribed by  Mayra Saldana Webb Christiano in my presence, and it is both accurate and complete.

## 2025-02-09 NOTE — ED NOTES
PIV placed, labs collected and sent for processing. No other needs noted at this time. Call light within reach

## 2025-02-09 NOTE — DISCHARGE INSTRUCTIONS
Your tests here were reassuring.  Your thyroid function is normal.  Medications are a common cause of fainting, along with stress, fatigue, exhaustion, and attention to hydration and nutrition.  We strongly recommend you discuss with your prescribing doctor whether stopping your propranolol would be reasonable.  This medication can lower blood pressure and heart rate enough to make you feel dizzy.  You may no longer need it with your methimazole.    The other common approach to recurrent unexplained fainting is to see cardiology to consider wearing a cardiac monitor.  We have placed a referral for you in case you would like to see cardiology to consider this option.

## 2025-02-14 ENCOUNTER — TELEPHONE (OUTPATIENT)
Dept: HEALTH INFORMATION MANAGEMENT | Facility: OTHER | Age: 25
End: 2025-02-14
Payer: COMMERCIAL